# Patient Record
Sex: FEMALE | Race: WHITE | NOT HISPANIC OR LATINO | Employment: OTHER | URBAN - METROPOLITAN AREA
[De-identification: names, ages, dates, MRNs, and addresses within clinical notes are randomized per-mention and may not be internally consistent; named-entity substitution may affect disease eponyms.]

---

## 2021-03-03 DIAGNOSIS — Z23 NEED FOR VACCINATION: ICD-10-CM

## 2023-02-16 ENCOUNTER — HOSPITAL ENCOUNTER (OUTPATIENT)
Dept: LAB | Facility: MEDICAL CENTER | Age: 73
End: 2023-02-16
Attending: OTOLARYNGOLOGY
Payer: MEDICARE

## 2023-02-16 LAB
CREAT SERPL-MCNC: 0.45 MG/DL (ref 0.5–1.4)
GFR SERPLBLD CREATININE-BSD FMLA CKD-EPI: 102 ML/MIN/1.73 M 2

## 2023-02-16 PROCEDURE — 82565 ASSAY OF CREATININE: CPT

## 2023-02-16 PROCEDURE — 36415 COLL VENOUS BLD VENIPUNCTURE: CPT

## 2023-02-20 ENCOUNTER — APPOINTMENT (OUTPATIENT)
Dept: RADIOLOGY | Facility: MEDICAL CENTER | Age: 73
DRG: 606 | End: 2023-02-20
Attending: EMERGENCY MEDICINE
Payer: MEDICARE

## 2023-02-20 ENCOUNTER — APPOINTMENT (OUTPATIENT)
Dept: RADIOLOGY | Facility: MEDICAL CENTER | Age: 73
DRG: 606 | End: 2023-02-20
Attending: INTERNAL MEDICINE
Payer: MEDICARE

## 2023-02-20 ENCOUNTER — HOSPITAL ENCOUNTER (INPATIENT)
Facility: MEDICAL CENTER | Age: 73
LOS: 2 days | DRG: 606 | End: 2023-02-22
Attending: EMERGENCY MEDICINE | Admitting: INTERNAL MEDICINE
Payer: MEDICARE

## 2023-02-20 DIAGNOSIS — E83.42 HYPOMAGNESEMIA: ICD-10-CM

## 2023-02-20 DIAGNOSIS — R91.8 HILAR MASS: ICD-10-CM

## 2023-02-20 DIAGNOSIS — R13.19 ESOPHAGEAL DYSPHAGIA: ICD-10-CM

## 2023-02-20 DIAGNOSIS — Z72.0 TOBACCO ABUSE: ICD-10-CM

## 2023-02-20 DIAGNOSIS — E87.6 HYPOKALEMIA: ICD-10-CM

## 2023-02-20 DIAGNOSIS — J96.01 ACUTE HYPOXEMIC RESPIRATORY FAILURE (HCC): ICD-10-CM

## 2023-02-20 DIAGNOSIS — R91.8 LUNG MASS: ICD-10-CM

## 2023-02-20 DIAGNOSIS — J90 PLEURAL EFFUSION ON LEFT: ICD-10-CM

## 2023-02-20 DIAGNOSIS — J18.9 POSTOBSTRUCTIVE PNEUMONIA: ICD-10-CM

## 2023-02-20 PROBLEM — E86.0 DEHYDRATION: Status: ACTIVE | Noted: 2023-02-20

## 2023-02-20 PROBLEM — D72.829 LEUKOCYTOSIS: Status: ACTIVE | Noted: 2023-02-20

## 2023-02-20 LAB
ALBUMIN SERPL BCP-MCNC: 3.7 G/DL (ref 3.2–4.9)
ALBUMIN/GLOB SERPL: 1 G/DL
ALP SERPL-CCNC: 103 U/L (ref 30–99)
ALT SERPL-CCNC: 9 U/L (ref 2–50)
ANION GAP SERPL CALC-SCNC: 22 MMOL/L (ref 7–16)
AST SERPL-CCNC: 20 U/L (ref 12–45)
BASOPHILS # BLD AUTO: 0.6 % (ref 0–1.8)
BASOPHILS # BLD: 0.07 K/UL (ref 0–0.12)
BILIRUB SERPL-MCNC: 0.4 MG/DL (ref 0.1–1.5)
BUN SERPL-MCNC: 12 MG/DL (ref 8–22)
CALCIUM ALBUM COR SERPL-MCNC: 10.1 MG/DL (ref 8.5–10.5)
CALCIUM SERPL-MCNC: 9.9 MG/DL (ref 8.5–10.5)
CHLORIDE SERPL-SCNC: 97 MMOL/L (ref 96–112)
CO2 SERPL-SCNC: 21 MMOL/L (ref 20–33)
CREAT SERPL-MCNC: 0.41 MG/DL (ref 0.5–1.4)
EKG IMPRESSION: NORMAL
EKG IMPRESSION: NORMAL
EOSINOPHIL # BLD AUTO: 0.05 K/UL (ref 0–0.51)
EOSINOPHIL NFR BLD: 0.4 % (ref 0–6.9)
ERYTHROCYTE [DISTWIDTH] IN BLOOD BY AUTOMATED COUNT: 48.5 FL (ref 35.9–50)
FLUAV RNA SPEC QL NAA+PROBE: NEGATIVE
FLUBV RNA SPEC QL NAA+PROBE: NEGATIVE
GFR SERPLBLD CREATININE-BSD FMLA CKD-EPI: 105 ML/MIN/1.73 M 2
GLOBULIN SER CALC-MCNC: 3.8 G/DL (ref 1.9–3.5)
GLUCOSE SERPL-MCNC: 139 MG/DL (ref 65–99)
HCT VFR BLD AUTO: 46.6 % (ref 37–47)
HGB BLD-MCNC: 15 G/DL (ref 12–16)
IMM GRANULOCYTES # BLD AUTO: 0.06 K/UL (ref 0–0.11)
IMM GRANULOCYTES NFR BLD AUTO: 0.5 % (ref 0–0.9)
LYMPHOCYTES # BLD AUTO: 1.69 K/UL (ref 1–4.8)
LYMPHOCYTES NFR BLD: 14.9 % (ref 22–41)
MCH RBC QN AUTO: 28.2 PG (ref 27–33)
MCHC RBC AUTO-ENTMCNC: 32.2 G/DL (ref 33.6–35)
MCV RBC AUTO: 87.6 FL (ref 81.4–97.8)
MONOCYTES # BLD AUTO: 0.89 K/UL (ref 0–0.85)
MONOCYTES NFR BLD AUTO: 7.9 % (ref 0–13.4)
NEUTROPHILS # BLD AUTO: 8.55 K/UL (ref 2–7.15)
NEUTROPHILS NFR BLD: 75.7 % (ref 44–72)
NRBC # BLD AUTO: 0 K/UL
NRBC BLD-RTO: 0 /100 WBC
PLATELET # BLD AUTO: 468 K/UL (ref 164–446)
PMV BLD AUTO: 9.3 FL (ref 9–12.9)
POTASSIUM SERPL-SCNC: 3.3 MMOL/L (ref 3.6–5.5)
PROCALCITONIN SERPL-MCNC: <0.05 NG/ML
PROT SERPL-MCNC: 7.5 G/DL (ref 6–8.2)
RBC # BLD AUTO: 5.32 M/UL (ref 4.2–5.4)
RSV RNA SPEC QL NAA+PROBE: NEGATIVE
SARS-COV-2 RNA RESP QL NAA+PROBE: NOTDETECTED
SODIUM SERPL-SCNC: 140 MMOL/L (ref 135–145)
SPECIMEN SOURCE: NORMAL
WBC # BLD AUTO: 11.3 K/UL (ref 4.8–10.8)

## 2023-02-20 PROCEDURE — 71260 CT THORAX DX C+: CPT

## 2023-02-20 PROCEDURE — 700117 HCHG RX CONTRAST REV CODE 255: Performed by: EMERGENCY MEDICINE

## 2023-02-20 PROCEDURE — 99223 1ST HOSP IP/OBS HIGH 75: CPT | Mod: 25,AI | Performed by: INTERNAL MEDICINE

## 2023-02-20 PROCEDURE — 96365 THER/PROPH/DIAG IV INF INIT: CPT

## 2023-02-20 PROCEDURE — 87040 BLOOD CULTURE FOR BACTERIA: CPT

## 2023-02-20 PROCEDURE — 770004 HCHG ROOM/CARE - ONCOLOGY PRIVATE *

## 2023-02-20 PROCEDURE — 99406 BEHAV CHNG SMOKING 3-10 MIN: CPT

## 2023-02-20 PROCEDURE — 700105 HCHG RX REV CODE 258: Performed by: EMERGENCY MEDICINE

## 2023-02-20 PROCEDURE — 700105 HCHG RX REV CODE 258: Performed by: INTERNAL MEDICINE

## 2023-02-20 PROCEDURE — 93005 ELECTROCARDIOGRAM TRACING: CPT | Performed by: EMERGENCY MEDICINE

## 2023-02-20 PROCEDURE — C9803 HOPD COVID-19 SPEC COLLECT: HCPCS | Performed by: EMERGENCY MEDICINE

## 2023-02-20 PROCEDURE — 84145 PROCALCITONIN (PCT): CPT

## 2023-02-20 PROCEDURE — 700111 HCHG RX REV CODE 636 W/ 250 OVERRIDE (IP): Performed by: INTERNAL MEDICINE

## 2023-02-20 PROCEDURE — 700111 HCHG RX REV CODE 636 W/ 250 OVERRIDE (IP): Performed by: EMERGENCY MEDICINE

## 2023-02-20 PROCEDURE — 0241U HCHG SARS-COV-2 COVID-19 NFCT DS RESP RNA 4 TRGT MIC: CPT

## 2023-02-20 PROCEDURE — 70491 CT SOFT TISSUE NECK W/DYE: CPT

## 2023-02-20 PROCEDURE — 85025 COMPLETE CBC W/AUTO DIFF WBC: CPT

## 2023-02-20 PROCEDURE — 99406 BEHAV CHNG SMOKING 3-10 MIN: CPT | Performed by: INTERNAL MEDICINE

## 2023-02-20 PROCEDURE — 82150 ASSAY OF AMYLASE: CPT

## 2023-02-20 PROCEDURE — 99285 EMERGENCY DEPT VISIT HI MDM: CPT

## 2023-02-20 PROCEDURE — 36415 COLL VENOUS BLD VENIPUNCTURE: CPT

## 2023-02-20 PROCEDURE — 71045 X-RAY EXAM CHEST 1 VIEW: CPT

## 2023-02-20 PROCEDURE — 96367 TX/PROPH/DG ADDL SEQ IV INF: CPT

## 2023-02-20 PROCEDURE — 93005 ELECTROCARDIOGRAM TRACING: CPT

## 2023-02-20 PROCEDURE — 80053 COMPREHEN METABOLIC PANEL: CPT

## 2023-02-20 RX ORDER — BISACODYL 10 MG
10 SUPPOSITORY, RECTAL RECTAL
Status: DISCONTINUED | OUTPATIENT
Start: 2023-02-20 | End: 2023-02-21

## 2023-02-20 RX ORDER — POLYETHYLENE GLYCOL 3350 17 G/17G
1 POWDER, FOR SOLUTION ORAL
Status: DISCONTINUED | OUTPATIENT
Start: 2023-02-20 | End: 2023-02-21

## 2023-02-20 RX ORDER — AZITHROMYCIN 500 MG/5ML
500 INJECTION, POWDER, LYOPHILIZED, FOR SOLUTION INTRAVENOUS EVERY 24 HOURS
Status: DISCONTINUED | OUTPATIENT
Start: 2023-02-20 | End: 2023-02-20

## 2023-02-20 RX ORDER — ATORVASTATIN CALCIUM 10 MG/1
10 TABLET, FILM COATED ORAL NIGHTLY
COMMUNITY

## 2023-02-20 RX ORDER — NICOTINE 21 MG/24HR
14 PATCH, TRANSDERMAL 24 HOURS TRANSDERMAL
Status: DISCONTINUED | OUTPATIENT
Start: 2023-02-20 | End: 2023-02-22 | Stop reason: HOSPADM

## 2023-02-20 RX ORDER — AZITHROMYCIN 250 MG/1
500 TABLET, FILM COATED ORAL DAILY
Status: DISCONTINUED | OUTPATIENT
Start: 2023-02-21 | End: 2023-02-21

## 2023-02-20 RX ORDER — LISINOPRIL 5 MG/1
5 TABLET ORAL EVERY MORNING
COMMUNITY

## 2023-02-20 RX ORDER — CEFTRIAXONE 2 G/1
2000 INJECTION, POWDER, FOR SOLUTION INTRAMUSCULAR; INTRAVENOUS ONCE
Status: DISCONTINUED | OUTPATIENT
Start: 2023-02-20 | End: 2023-02-20

## 2023-02-20 RX ORDER — SODIUM CHLORIDE, SODIUM LACTATE, POTASSIUM CHLORIDE, CALCIUM CHLORIDE 600; 310; 30; 20 MG/100ML; MG/100ML; MG/100ML; MG/100ML
INJECTION, SOLUTION INTRAVENOUS CONTINUOUS
Status: DISCONTINUED | OUTPATIENT
Start: 2023-02-20 | End: 2023-02-21

## 2023-02-20 RX ORDER — AMOXICILLIN 250 MG
2 CAPSULE ORAL 2 TIMES DAILY
Status: DISCONTINUED | OUTPATIENT
Start: 2023-02-20 | End: 2023-02-21

## 2023-02-20 RX ORDER — AZITHROMYCIN 500 MG/1
500 INJECTION, POWDER, LYOPHILIZED, FOR SOLUTION INTRAVENOUS ONCE
Status: COMPLETED | OUTPATIENT
Start: 2023-02-20 | End: 2023-02-20

## 2023-02-20 RX ORDER — ONDANSETRON 2 MG/ML
4 INJECTION INTRAMUSCULAR; INTRAVENOUS EVERY 4 HOURS PRN
Status: DISCONTINUED | OUTPATIENT
Start: 2023-02-20 | End: 2023-02-22 | Stop reason: HOSPADM

## 2023-02-20 RX ADMIN — AMPICILLIN AND SULBACTAM 3 G: 1; 2 INJECTION, POWDER, FOR SOLUTION INTRAMUSCULAR; INTRAVENOUS at 22:36

## 2023-02-20 RX ADMIN — AZITHROMYCIN MONOHYDRATE 500 MG: 500 INJECTION, POWDER, LYOPHILIZED, FOR SOLUTION INTRAVENOUS at 17:45

## 2023-02-20 RX ADMIN — SODIUM CHLORIDE, POTASSIUM CHLORIDE, SODIUM LACTATE AND CALCIUM CHLORIDE: 600; 310; 30; 20 INJECTION, SOLUTION INTRAVENOUS at 19:08

## 2023-02-20 RX ADMIN — IOHEXOL 90 ML: 350 INJECTION, SOLUTION INTRAVENOUS at 15:42

## 2023-02-20 RX ADMIN — AMPICILLIN AND SULBACTAM 3 G: 1; 2 INJECTION, POWDER, FOR SOLUTION INTRAMUSCULAR; INTRAVENOUS at 16:52

## 2023-02-20 ASSESSMENT — ENCOUNTER SYMPTOMS
COUGH: 1
NAUSEA: 0
ORTHOPNEA: 1
FEVER: 0
VOMITING: 0
BLURRED VISION: 0
INSOMNIA: 0
BACK PAIN: 0
HEADACHES: 0
EYE PAIN: 0
NERVOUS/ANXIOUS: 0
WEAKNESS: 1
ABDOMINAL PAIN: 0
PALPITATIONS: 0
SHORTNESS OF BREATH: 1
CHILLS: 0
DIZZINESS: 0

## 2023-02-20 ASSESSMENT — LIFESTYLE VARIABLES: DO YOU DRINK ALCOHOL: NO

## 2023-02-20 NOTE — ED PROVIDER NOTES
ED Provider Note    CHIEF COMPLAINT  Chief Complaint   Patient presents with    Shortness of Breath     Pt reports to having a paralyzed vocal cord and is scheduled tomorrow to get a CT scan done however they are concerned that the patient is losing a great deal of weight, she feels more short of breath, pts daughter also reports that she has a cough but is unable to cough out sputum due to her condition.        EXTERNAL RECORDS REVIEWED  Reviewed outpatient clinic visits    HPI/ROS  LIMITATION TO HISTORY   None additional history provided by daughter  OUTSIDE HISTORIAN(S):  Additional history provided by daughter    Shantal Quach is a 72 y.o. female who presents for evaluation of worsening shortness of breath persistent and worsening sore throat with presumed vocal cord dysfunction.  The patient has an extensive smoking history but otherwise is relatively healthy.  Over the past 5 to 6 weeks she has had worsening sore throat and cough.  She has been referred to ENT and has recently seen Dr. Lundberg.  She is scheduled for CT scan of the neck tomorrow but symptoms worsened and presented here for evaluation.  She was noted to be tachycardic and Having slightly low oxygen levels at 91% in triage and immediately brought back.  She denies leg swelling or hemoptysis.  No known history of malignancy.    PAST MEDICAL HISTORY   Extensive smoking history    SURGICAL HISTORY   has a past surgical history that includes tonsillectomy.  None reported  FAMILY HISTORY  Family History   Problem Relation Age of Onset    Cancer Father         lung - in 50's    Cancer Maternal Aunt         colon    Heart Disease Neg Hx      History of cancer  SOCIAL HISTORY  Social History     Tobacco Use    Smoking status: Every Day     Packs/day: 2.00     Years: 35.00     Pack years: 70.00     Types: Cigarettes    Smokeless tobacco: Never   Substance and Sexual Activity    Alcohol use: Yes     Comment: rare    Drug use: Not on file    Sexual  "activity: Not on file     Comment: ; two children; wk: care giver     Extensive tobacco history  CURRENT MEDICATIONS  Home Medications       Reviewed by Gabriel Arceo R.N. (Registered Nurse) on 02/20/23 at 1313  Med List Status: Partial     Medication Last Dose Status   famciclovir (FAMVIR) 500 MG Tab  Active   hydrocodone-acetaminophen (NORCO) 5-325 MG Tab per tablet  Active                    ALLERGIES  No Known Allergies    PHYSICAL EXAM  VITAL SIGNS: /81   Pulse (!) 109   Temp 36.9 °C (98.4 °F) (Temporal)   Resp (!) 23   Ht 1.676 m (5' 6\")   Wt 57.2 kg (126 lb)   SpO2 90%   BMI 20.34 kg/m²    Pulse ox interpretation: I interpret this pulse ox as low normal   constitutional: Alert and oriented x 3, mild distress and appears chronically ill  HEENT: Atraumatic normocephalic, pupils are equal round reactive to light extraocular movements are intact. The nares is clear, external ears are normal, mouth shows moist mucous membranes normal dentition for age  Neck: Supple, no JVD no tracheal deviation  Cardiovascular: Mild tachycardia no murmur rub or gallop 2+ pulses peripherally x4  Thorax & Lungs: No respiratory distress, no wheezes rales or rhonchi, No chest tenderness.   GI: Soft nontender nondistended positive bowel sounds, no peritoneal signs  Skin: Warm dry no acute rash or lesion  Musculoskeletal: Moving all extremities with full range and 5 of 5 strength no acute  deformity  Neurologic: Cranial nerves III through XII are grossly intact no sensory deficit no cerebellar dysfunction   Psychiatric: Appropriate affect for situation at this time          DIAGNOSTIC STUDIES / PROCEDURES      LABS  Results for orders placed or performed during the hospital encounter of 02/20/23   CBC with Differential   Result Value Ref Range    WBC 11.3 (H) 4.8 - 10.8 K/uL    RBC 5.32 4.20 - 5.40 M/uL    Hemoglobin 15.0 12.0 - 16.0 g/dL    Hematocrit 46.6 37.0 - 47.0 %    MCV 87.6 81.4 - 97.8 fL    MCH 28.2 " 27.0 - 33.0 pg    MCHC 32.2 (L) 33.6 - 35.0 g/dL    RDW 48.5 35.9 - 50.0 fL    Platelet Count 468 (H) 164 - 446 K/uL    MPV 9.3 9.0 - 12.9 fL    Neutrophils-Polys 75.70 (H) 44.00 - 72.00 %    Lymphocytes 14.90 (L) 22.00 - 41.00 %    Monocytes 7.90 0.00 - 13.40 %    Eosinophils 0.40 0.00 - 6.90 %    Basophils 0.60 0.00 - 1.80 %    Immature Granulocytes 0.50 0.00 - 0.90 %    Nucleated RBC 0.00 /100 WBC    Neutrophils (Absolute) 8.55 (H) 2.00 - 7.15 K/uL    Lymphs (Absolute) 1.69 1.00 - 4.80 K/uL    Monos (Absolute) 0.89 (H) 0.00 - 0.85 K/uL    Eos (Absolute) 0.05 0.00 - 0.51 K/uL    Baso (Absolute) 0.07 0.00 - 0.12 K/uL    Immature Granulocytes (abs) 0.06 0.00 - 0.11 K/uL    NRBC (Absolute) 0.00 K/uL   Comp Metabolic Panel   Result Value Ref Range    Sodium 140 135 - 145 mmol/L    Potassium 3.3 (L) 3.6 - 5.5 mmol/L    Chloride 97 96 - 112 mmol/L    Co2 21 20 - 33 mmol/L    Anion Gap 22.0 (H) 7.0 - 16.0    Glucose 139 (H) 65 - 99 mg/dL    Bun 12 8 - 22 mg/dL    Creatinine 0.41 (L) 0.50 - 1.40 mg/dL    Calcium 9.9 8.5 - 10.5 mg/dL    AST(SGOT) 20 12 - 45 U/L    ALT(SGPT) 9 2 - 50 U/L    Alkaline Phosphatase 103 (H) 30 - 99 U/L    Total Bilirubin 0.4 0.1 - 1.5 mg/dL    Albumin 3.7 3.2 - 4.9 g/dL    Total Protein 7.5 6.0 - 8.2 g/dL    Globulin 3.8 (H) 1.9 - 3.5 g/dL    A-G Ratio 1.0 g/dL   ESTIMATED GFR   Result Value Ref Range    GFR (CKD-EPI) 105 >60 mL/min/1.73 m 2   CORRECTED CALCIUM   Result Value Ref Range    Correct Calcium 10.1 8.5 - 10.5 mg/dL   CoV-2, FLU A/B, and RSV by PCR (2-4 Hours CEPHEID) : Collect NP swab in VTM    Specimen: Nasal; Respirate   Result Value Ref Range    SARS-CoV-2 Source NP Swab    PROCALCITONIN   Result Value Ref Range    Procalcitonin <0.05 <0.25 ng/mL   EKG   Result Value Ref Range    Report       [facility] Emergency Dept.    Test Date:  2023-02-20  Pt Name:    VERONICA RAIN               Department: ER  MRN:        [id number]                      Room:  Gender:      Female                       Technician: [id number]  :        1950                   Requested By:ER TRIAGE PROTOCOL  Order #:    [id number]                    Reading MD:    Measurements  Intervals                                Axis  Rate:       116                          P:          64  FL:         172                          QRS:        -5  QRSD:       82                           T:          163  QT:         290  QTc:        403    Interpretive Statements  Sinus tachycardia  Probable left atrial enlargement  Borderline repolarization abnormality  Borderline ST elevation, anterior leads  No previous ECG available for comparison           RADIOLOGY  I have independently interpreted the diagnostic imaging associated with this visit and am waiting the final reading from the radiologist.   My preliminary interpretation is a follows: Large left-sided mediastinal mass  Radiologist interpretation:   CT-CHEST (THORAX) WITH   Final Result      1.  There is a large mediastinal mass extending into the left hilar region encasing pulmonary arteries and portions of the thoracic aorta with rightward mass effect on the trachea and narrowing of the proximal bronchi and left lower lobe and lingular    segmental bronchi. Findings are consistent with malignancy with lymphoma considered most likely etiology.   2.  There is a moderate-sized left pleural effusion.   3.  Anteromedial left upper lobe airspace process most consistent postobstructive pneumonitis.   4.  Left lower lobe and lingular opacities could represent pneumonitis or atelectasis.   5.  There is underlying emphysema.         Fleischner Society pulmonary nodule recommendations:   Not Applicable         CT-SOFT TISSUE NECK WITH   Final Result      1.  There is no cervical lymphadenopathy.   2.  Airway is patent.   3.  No evidence of acute inflammation.   4.  Upper intrathoracic findings as discussed on prior chest CT.      DX-CHEST-PORTABLE (1 VIEW)   Final Result          Elevation of the diaphragm with left basilar atelectasis. Superimposed infection is possible.      Small left pleural effusion.          COURSE & MEDICAL DECISION MAKING    ED Observation Status? No; Patient does not meet criteria for ED Observation.     INITIAL ASSESSMENT, COURSE AND PLAN  Care Narrative: This is a very pleasant 72-year-old female who comes in with several weeks of worsening hoarseness cough shortness of breath.  She has an extensive smoking history.  Patient underwent an extensive evaluation today including blood testing, viral testing chest x-ray.  Chest x-ray was grossly abnormal and subsequent CT scans of the neck and thorax were performed.  Unfortunately this CT scan is the first to diagnose a large left mediastinal mass consistent with likely malignancy.  It encompasses several of her several thoracic vital structures.  Blood cultures and viral testing is currently pending.  Consultation with infectious disease pharmacist was obtained the recommended IV Unasyn to cover for possible aspiration.  Patient will be hospitalized with the hospitalist service      ADDITIONAL PROBLEM LIST    DISPOSITION AND DISCUSSIONS  I have discussed management of the patient with the following physicians and LUIS's: Discussed with hospitalist team coordinated antibiotic choice with ER pharmacist    Discussion of management with other QHP or appropriate source(s): Discussed with ER pharmacist      FINAL DIAGNOSIS  New diagnosis of left mediastinal mass  Postobstructive pneumonia/pneumonitis       Electronically signed by: Jose Lanier M.D., 2/20/2023 2:00 PM

## 2023-02-20 NOTE — ED TRIAGE NOTES
"Shantal Quach  72 y.o.    Chief Complaint   Patient presents with    Shortness of Breath     Pt reports to having a paralyzed vocal cord and is scheduled tomorrow to get a CT scan done however they are concerned that the patient is losing a great deal of weight, she feels more short of breath, pts daughter also reports that she has a cough but is unable to cough out sputum due to her condition.        /86   Pulse (!) 119   Temp 36.9 °C (98.4 °F) (Temporal)   Resp 16   Ht 1.676 m (5' 6\")   Wt 57.2 kg (126 lb)   SpO2 91%   BMI 20.34 kg/m²     Protocol placed, pt placed in lobby and educated to alert staff with any changes or concerns.   "

## 2023-02-21 ENCOUNTER — APPOINTMENT (OUTPATIENT)
Dept: RADIOLOGY | Facility: MEDICAL CENTER | Age: 73
End: 2023-02-21
Attending: OTOLARYNGOLOGY
Payer: MEDICARE

## 2023-02-21 ENCOUNTER — APPOINTMENT (OUTPATIENT)
Dept: RADIOLOGY | Facility: MEDICAL CENTER | Age: 73
DRG: 606 | End: 2023-02-21
Attending: INTERNAL MEDICINE
Payer: MEDICARE

## 2023-02-21 DIAGNOSIS — R91.8 LUNG MASS: ICD-10-CM

## 2023-02-21 LAB
AMYLASE FLD-CCNC: 26 U/L
ANION GAP SERPL CALC-SCNC: 19 MMOL/L (ref 7–16)
APPEARANCE FLD: NORMAL
BODY FLD TYPE: NORMAL
BUN SERPL-MCNC: 9 MG/DL (ref 8–22)
CALCIUM SERPL-MCNC: 9.5 MG/DL (ref 8.5–10.5)
CHLORIDE SERPL-SCNC: 100 MMOL/L (ref 96–112)
CO2 SERPL-SCNC: 23 MMOL/L (ref 20–33)
COLOR FLD: NORMAL
CREAT SERPL-MCNC: 0.41 MG/DL (ref 0.5–1.4)
CYTOLOGY REG CYTOL: NORMAL
ERYTHROCYTE [DISTWIDTH] IN BLOOD BY AUTOMATED COUNT: 49.3 FL (ref 35.9–50)
FUNGUS SPEC FUNGUS STN: NORMAL
GFR SERPLBLD CREATININE-BSD FMLA CKD-EPI: 105 ML/MIN/1.73 M 2
GLUCOSE FLD-MCNC: 117 MG/DL
GLUCOSE SERPL-MCNC: 121 MG/DL (ref 65–99)
GRAM STN SPEC: NORMAL
HCT VFR BLD AUTO: 43 % (ref 37–47)
HGB BLD-MCNC: 14.1 G/DL (ref 12–16)
INR PPP: 1.09 (ref 0.87–1.13)
LDH FLD L TO P-CCNC: 831 U/L
LDH SERPL L TO P-CCNC: 480 U/L (ref 107–266)
LYMPHOCYTES NFR FLD: 91 %
MAGNESIUM SERPL-MCNC: 1.4 MG/DL (ref 1.5–2.5)
MCH RBC QN AUTO: 28.7 PG (ref 27–33)
MCHC RBC AUTO-ENTMCNC: 32.8 G/DL (ref 33.6–35)
MCV RBC AUTO: 87.4 FL (ref 81.4–97.8)
MESOTHL CELL NFR FLD: 1 %
MONONUC CELLS NFR FLD: 4 %
NEUTROPHILS NFR FLD: 4 %
PH FLD: 8 [PH]
PLATELET # BLD AUTO: 416 K/UL (ref 164–446)
PMV BLD AUTO: 9.6 FL (ref 9–12.9)
POTASSIUM SERPL-SCNC: 3 MMOL/L (ref 3.6–5.5)
PROT FLD-MCNC: 4.9 G/DL
PROT SERPL-MCNC: 7.2 G/DL (ref 6–8.2)
PROTHROMBIN TIME: 14 SEC (ref 12–14.6)
RBC # BLD AUTO: 4.92 M/UL (ref 4.2–5.4)
RBC # FLD: NORMAL CELLS/UL
SIGNIFICANT IND 70042: NORMAL
SIGNIFICANT IND 70042: NORMAL
SITE SITE: NORMAL
SITE SITE: NORMAL
SODIUM SERPL-SCNC: 142 MMOL/L (ref 135–145)
SOURCE SOURCE: NORMAL
SOURCE SOURCE: NORMAL
WBC # BLD AUTO: 8.9 K/UL (ref 4.8–10.8)
WBC # FLD: 5926 CELLS/UL

## 2023-02-21 PROCEDURE — 0W9B3ZZ DRAINAGE OF LEFT PLEURAL CAVITY, PERCUTANEOUS APPROACH: ICD-10-PCS | Performed by: RADIOLOGY

## 2023-02-21 PROCEDURE — 700117 HCHG RX CONTRAST REV CODE 255: Performed by: INTERNAL MEDICINE

## 2023-02-21 PROCEDURE — 92612 ENDOSCOPY SWALLOW (FEES) VID: CPT

## 2023-02-21 PROCEDURE — A9270 NON-COVERED ITEM OR SERVICE: HCPCS | Performed by: INTERNAL MEDICINE

## 2023-02-21 PROCEDURE — 80048 BASIC METABOLIC PNL TOTAL CA: CPT

## 2023-02-21 PROCEDURE — 84155 ASSAY OF PROTEIN SERUM: CPT

## 2023-02-21 PROCEDURE — 700102 HCHG RX REV CODE 250 W/ 637 OVERRIDE(OP): Performed by: INTERNAL MEDICINE

## 2023-02-21 PROCEDURE — 87015 SPECIMEN INFECT AGNT CONCNTJ: CPT

## 2023-02-21 PROCEDURE — 700111 HCHG RX REV CODE 636 W/ 250 OVERRIDE (IP): Performed by: STUDENT IN AN ORGANIZED HEALTH CARE EDUCATION/TRAINING PROGRAM

## 2023-02-21 PROCEDURE — 88112 CYTOPATH CELL ENHANCE TECH: CPT

## 2023-02-21 PROCEDURE — 70470 CT HEAD/BRAIN W/O & W/DYE: CPT

## 2023-02-21 PROCEDURE — 87070 CULTURE OTHR SPECIMN AEROBIC: CPT

## 2023-02-21 PROCEDURE — 82945 GLUCOSE OTHER FLUID: CPT

## 2023-02-21 PROCEDURE — 36415 COLL VENOUS BLD VENIPUNCTURE: CPT

## 2023-02-21 PROCEDURE — 85027 COMPLETE CBC AUTOMATED: CPT

## 2023-02-21 PROCEDURE — 71045 X-RAY EXAM CHEST 1 VIEW: CPT

## 2023-02-21 PROCEDURE — 700102 HCHG RX REV CODE 250 W/ 637 OVERRIDE(OP): Performed by: STUDENT IN AN ORGANIZED HEALTH CARE EDUCATION/TRAINING PROGRAM

## 2023-02-21 PROCEDURE — 84157 ASSAY OF PROTEIN OTHER: CPT

## 2023-02-21 PROCEDURE — 87206 SMEAR FLUORESCENT/ACID STAI: CPT

## 2023-02-21 PROCEDURE — 99232 SBSQ HOSP IP/OBS MODERATE 35: CPT | Performed by: STUDENT IN AN ORGANIZED HEALTH CARE EDUCATION/TRAINING PROGRAM

## 2023-02-21 PROCEDURE — 85610 PROTHROMBIN TIME: CPT

## 2023-02-21 PROCEDURE — A9270 NON-COVERED ITEM OR SERVICE: HCPCS | Performed by: STUDENT IN AN ORGANIZED HEALTH CARE EDUCATION/TRAINING PROGRAM

## 2023-02-21 PROCEDURE — 88341 IMHCHEM/IMCYTCHM EA ADD ANTB: CPT

## 2023-02-21 PROCEDURE — 88305 TISSUE EXAM BY PATHOLOGIST: CPT

## 2023-02-21 PROCEDURE — 83615 LACTATE (LD) (LDH) ENZYME: CPT | Mod: 91

## 2023-02-21 PROCEDURE — 88342 IMHCHEM/IMCYTCHM 1ST ANTB: CPT

## 2023-02-21 PROCEDURE — 87205 SMEAR GRAM STAIN: CPT | Mod: 91

## 2023-02-21 PROCEDURE — 92610 EVALUATE SWALLOWING FUNCTION: CPT

## 2023-02-21 PROCEDURE — 87102 FUNGUS ISOLATION CULTURE: CPT

## 2023-02-21 PROCEDURE — 700101 HCHG RX REV CODE 250: Performed by: STUDENT IN AN ORGANIZED HEALTH CARE EDUCATION/TRAINING PROGRAM

## 2023-02-21 PROCEDURE — 700111 HCHG RX REV CODE 636 W/ 250 OVERRIDE (IP): Performed by: INTERNAL MEDICINE

## 2023-02-21 PROCEDURE — 70460 CT HEAD/BRAIN W/DYE: CPT

## 2023-02-21 PROCEDURE — 83735 ASSAY OF MAGNESIUM: CPT

## 2023-02-21 PROCEDURE — 89051 BODY FLUID CELL COUNT: CPT

## 2023-02-21 PROCEDURE — 99222 1ST HOSP IP/OBS MODERATE 55: CPT | Mod: GC | Performed by: INTERNAL MEDICINE

## 2023-02-21 PROCEDURE — 74177 CT ABD & PELVIS W/CONTRAST: CPT

## 2023-02-21 PROCEDURE — 700105 HCHG RX REV CODE 258: Performed by: INTERNAL MEDICINE

## 2023-02-21 PROCEDURE — 4410569 US-THORACENTESIS PUNCTURE

## 2023-02-21 PROCEDURE — 87116 MYCOBACTERIA CULTURE: CPT

## 2023-02-21 PROCEDURE — 83986 ASSAY PH BODY FLUID NOS: CPT

## 2023-02-21 PROCEDURE — 770004 HCHG ROOM/CARE - ONCOLOGY PRIVATE *

## 2023-02-21 RX ORDER — SODIUM CHLORIDE AND POTASSIUM CHLORIDE 300; 900 MG/100ML; MG/100ML
1000 INJECTION, SOLUTION INTRAVENOUS CONTINUOUS
Status: DISCONTINUED | OUTPATIENT
Start: 2023-02-21 | End: 2023-02-22

## 2023-02-21 RX ORDER — MAGNESIUM SULFATE HEPTAHYDRATE 40 MG/ML
2 INJECTION, SOLUTION INTRAVENOUS ONCE
Status: COMPLETED | OUTPATIENT
Start: 2023-02-21 | End: 2023-02-21

## 2023-02-21 RX ORDER — LISINOPRIL 2.5 MG/1
5 TABLET ORAL EVERY MORNING
Status: DISCONTINUED | OUTPATIENT
Start: 2023-02-22 | End: 2023-02-22 | Stop reason: HOSPADM

## 2023-02-21 RX ORDER — POTASSIUM CHLORIDE 20 MEQ/1
40 TABLET, EXTENDED RELEASE ORAL EVERY 4 HOURS
Status: ACTIVE | OUTPATIENT
Start: 2023-02-21 | End: 2023-02-21

## 2023-02-21 RX ORDER — BISACODYL 10 MG
10 SUPPOSITORY, RECTAL RECTAL
Status: DISCONTINUED | OUTPATIENT
Start: 2023-02-21 | End: 2023-02-22 | Stop reason: HOSPADM

## 2023-02-21 RX ORDER — IPRATROPIUM BROMIDE AND ALBUTEROL SULFATE 2.5; .5 MG/3ML; MG/3ML
3 SOLUTION RESPIRATORY (INHALATION)
Status: DISCONTINUED | OUTPATIENT
Start: 2023-02-21 | End: 2023-02-22 | Stop reason: HOSPADM

## 2023-02-21 RX ORDER — OMEPRAZOLE 20 MG/1
20 CAPSULE, DELAYED RELEASE ORAL 2 TIMES DAILY
Status: DISCONTINUED | OUTPATIENT
Start: 2023-02-21 | End: 2023-02-22 | Stop reason: HOSPADM

## 2023-02-21 RX ORDER — POTASSIUM CHLORIDE 20 MEQ/1
40 TABLET, EXTENDED RELEASE ORAL ONCE
Status: COMPLETED | OUTPATIENT
Start: 2023-02-21 | End: 2023-02-21

## 2023-02-21 RX ORDER — POLYETHYLENE GLYCOL 3350 17 G/17G
1 POWDER, FOR SOLUTION ORAL
Status: DISCONTINUED | OUTPATIENT
Start: 2023-02-21 | End: 2023-02-22 | Stop reason: HOSPADM

## 2023-02-21 RX ORDER — ATORVASTATIN CALCIUM 10 MG/1
10 TABLET, FILM COATED ORAL NIGHTLY
Status: DISCONTINUED | OUTPATIENT
Start: 2023-02-21 | End: 2023-02-22 | Stop reason: HOSPADM

## 2023-02-21 RX ORDER — AMOXICILLIN 250 MG
2 CAPSULE ORAL 2 TIMES DAILY PRN
Status: DISCONTINUED | OUTPATIENT
Start: 2023-02-21 | End: 2023-02-22 | Stop reason: HOSPADM

## 2023-02-21 RX ADMIN — SENNOSIDES AND DOCUSATE SODIUM 2 TABLET: 50; 8.6 TABLET ORAL at 17:23

## 2023-02-21 RX ADMIN — MAGNESIUM SULFATE HEPTAHYDRATE 2 G: 40 INJECTION, SOLUTION INTRAVENOUS at 10:34

## 2023-02-21 RX ADMIN — ATORVASTATIN CALCIUM 10 MG: 10 TABLET, FILM COATED ORAL at 21:32

## 2023-02-21 RX ADMIN — POTASSIUM CHLORIDE 40 MEQ: 1500 TABLET, EXTENDED RELEASE ORAL at 19:30

## 2023-02-21 RX ADMIN — SODIUM CHLORIDE, POTASSIUM CHLORIDE, SODIUM LACTATE AND CALCIUM CHLORIDE: 600; 310; 30; 20 INJECTION, SOLUTION INTRAVENOUS at 05:39

## 2023-02-21 RX ADMIN — OMEPRAZOLE 20 MG: 20 CAPSULE, DELAYED RELEASE ORAL at 17:23

## 2023-02-21 RX ADMIN — POTASSIUM CHLORIDE AND SODIUM CHLORIDE 1000 ML: 900; 300 INJECTION, SOLUTION INTRAVENOUS at 19:28

## 2023-02-21 RX ADMIN — AMPICILLIN AND SULBACTAM 3 G: 1; 2 INJECTION, POWDER, FOR SOLUTION INTRAMUSCULAR; INTRAVENOUS at 05:13

## 2023-02-21 RX ADMIN — IOHEXOL 80 ML: 350 INJECTION, SOLUTION INTRAVENOUS at 15:50

## 2023-02-21 RX ADMIN — NICOTINE 14 MG: 14 PATCH TRANSDERMAL at 05:12

## 2023-02-21 ASSESSMENT — COGNITIVE AND FUNCTIONAL STATUS - GENERAL
SUGGESTED CMS G CODE MODIFIER MOBILITY: CH
DAILY ACTIVITIY SCORE: 24
MOBILITY SCORE: 24
SUGGESTED CMS G CODE MODIFIER DAILY ACTIVITY: CH

## 2023-02-21 ASSESSMENT — ENCOUNTER SYMPTOMS
STRIDOR: 0
PALPITATIONS: 0
CHILLS: 0
BACK PAIN: 0
DIAPHORESIS: 0
NAUSEA: 0
HEADACHES: 0
BLURRED VISION: 0
BRUISES/BLEEDS EASILY: 0
DOUBLE VISION: 0
INSOMNIA: 0
POLYDIPSIA: 0
WHEEZING: 0
COUGH: 0
WEIGHT LOSS: 1
VOMITING: 0
SPUTUM PRODUCTION: 0
DIZZINESS: 0
SHORTNESS OF BREATH: 1
ABDOMINAL PAIN: 0
DIARRHEA: 0
CONSTIPATION: 0
FEVER: 0
MYALGIAS: 0

## 2023-02-21 ASSESSMENT — COPD QUESTIONNAIRES
DURING THE PAST 4 WEEKS HOW MUCH DID YOU FEEL SHORT OF BREATH: NONE/LITTLE OF THE TIME
HAVE YOU SMOKED AT LEAST 100 CIGARETTES IN YOUR ENTIRE LIFE: YES
COPD SCREENING SCORE: 4
DO YOU EVER COUGH UP ANY MUCUS OR PHLEGM?: NO/ONLY WITH OCCASIONAL COLDS OR INFECTIONS

## 2023-02-21 ASSESSMENT — PATIENT HEALTH QUESTIONNAIRE - PHQ9
1. LITTLE INTEREST OR PLEASURE IN DOING THINGS: NOT AT ALL
SUM OF ALL RESPONSES TO PHQ9 QUESTIONS 1 AND 2: 0
2. FEELING DOWN, DEPRESSED, IRRITABLE, OR HOPELESS: NOT AT ALL

## 2023-02-21 ASSESSMENT — LIFESTYLE VARIABLES: SUBSTANCE_ABUSE: 0

## 2023-02-21 NOTE — ASSESSMENT & PLAN NOTE
SLP consulted, underwent FEES with visualized LES reflux likely due to distort ation by mediastinal mass  She has opted against PEG placement and to eat despite risk

## 2023-02-21 NOTE — THERAPY
"Speech Language Pathology   Fiberoptic Endoscopic Evaluation of Swallow     Patient Name: Shantal Quach  AGE:  72 y.o., SEX:  female  Medical Record #: [id number]  Today's Date: 2/21/2023     Precautions  Precautions: Swallow Precautions ( See Comments)    HPI: 71 YO female admitted 2/20 2/2 SOB and hoarseness.     PMHx: tobacco use.     CMHx: hilar mass, acute hypoxemic respiratory failure, pleural effusion on L, tobacco abuse, dehydration, leukocytoisis, dysphagia, recent dx of paralyzed vocal fold.     CXR 2/21 \"   1.  Small left pleural effusion with elevation of the left hemidiaphragm. No evidence of left-sided pneumothorax status post thoracentesis.2.  Ectasia of the thoracic aorta.3.  Prominence of the left pulmonary hilum which may be secondary to adenopathy/mass.\"    CT soft tissue of neck 2/20 \"1.  There is no cervical lymphadenopathy.  2.  Airway is patent.  3.  No evidence of acute inflammation.  4.  Upper intrathoracic findings as discussed on prior chest CT.\"    CT Chest 2/20 \"1.  There is a large mediastinal mass extending into the left hilar region encasing pulmonary arteries and portions of the thoracic aorta with rightward mass effect on the trachea and narrowing of the proximal bronchi and left lower lobe and lingular   segmental bronchi. Findings are consistent with malignancy with lymphoma considered most likely etiology.  2.  There is a moderate-sized left pleural effusion.  3.  Anteromedial left upper lobe airspace process most consistent postobstructive pneumonitis.  4.  Left lower lobe and lingular opacities could represent pneumonitis or atelectasis.  5.  There is underlying emphysema.\"      Current Method of Nutrition   NPO until cleared by speech pathology      Pertinent Information:  Affect/Behavior: Calm, Cooperative  Oxygen Requirements:  2 L Nasal Cannula  Feeding Tube: None  Dentition: Fair  Factor(s) Affecting Performance: None    Discussed the risks, benefits, and alternatives " of the FEES procedure. Patient/family acknowledged and agreed to proceed.     Assessment  Flexible Endoscopic Evaluation of Swallowing (FEES) completed at bedside today. The endoscope was passed transnasally via right nare to evaluate the anatomy and physiology of swallowing. Pt tolerated the procedure with no apparent distress.    Anatomic Findings:  Protrusion from posterior pharyngeal wall at the level of the epiglottis, can be consistent with osteophyte  Vocal Fold Motion:  Limited mobility of left arytenoid, hyperfunction of right arytenoid, pt did not achieve full vocal fold closure despite hyperfunction of right arytenoid.  Secretion Management: WNL  PO trials: ice chips, thin liquids, mildly thick liquids, liquidized      Consistency PAS Score Timing Comments   Ice Chips 1 N/A    Thin Liquid 7 Post swallow PAS 3 after tsp  PAS 5 after tsp   PAS 7 after cup       Trace vallecular and lateral channel residue    Mildly Thick Liquid 5 Post swallow PAS 3 after tsp   PAS  5 after cup    Mild vallecular residue    Liquidized 7 Post swallow PAS 7 after    Mild vallecular residue      Penetration-Aspiration Scale (PAS)  1     No contrast enters airway  2     Contrast enters the airway, remains above the vocal folds, and is ejected from the airway (not seen in the airway at the end of the swallow).  3     Contrast enters the airway, remains above the vocal folds, and is not ejected from the airway (is seen in the airway after the swallow).  4     Contrast enters the airway, contacts the vocal folds, and is ejected from the airway.  5     Contrast enters the airway, contacts the vocal folds, and is not ejected from the airway  6     Contrast enters the airway, crosses the plane of the vocal folds, and is ejected from the airway.  7     Contrast enters the airway, crosses the plane of the vocal folds, and is not ejected from the airway despite effort.  8     Contrast enters the airway, crosses the plane of the vocal  folds, is not ejected from the airway and there is no response to aspiration.    Oral phase:  - Adequate bolus acceptance, containment and transit.   - Pt demonstrated functional mastication of ice chips   - Swallow initiation at the level of the vallecula     Pharyngeal phase:  - No significant pharyngeal residue  - No penetration or aspiration before or during the swallow  - Pt is not as risk for descending aspiration    Esophageal Phase  - Retrograde flow above the level of the UES across trials  - Even after re-swallowing retrograde flow material, bolus was regurgitated again, and with cues pt eventually worked bolus back into oral cavity to be removed via oral suctioning.  - Penetration to the level of the vocal folds after the swallow on retrograde flow material with thins by tsp and MTL by cup  - Aspiration of retrograde flow material occurred with thins by cup and liquidized   - Of note, pt denies hx of reflux/GERD  - Pt is at high risk for ascending aspiration  - Pt was sensate to episodes of penetration and aspiration, but a spontaneous cleansing swallow was not effective in clearing penetrated material, nor was a volitional cough effective in clearing aspirated material     Clinical Impressions  The pt presents with a severe pharyngeal-esophageal dysphagia, likely acute related to large mediastinal mass. Pt presenting with function oropharyngeal swallow and is not at risk for descending aspiration. However, pt is AT HIGH RISK FOR ASCENDING ASPIRATION due to severe esophageal dysphagia characterized by immediate retrograde flow above the level of the UES with penetration and aspiration of retrograde flow material.      MD to discuss results and nutrition options further with patient. A PO diet does not appear functional at this time as pt immediately has retrograde flow of all bolus types which she is able to work into oral cavity and remove via [place]. A PO diet would also increase the risk of  developing aspiration pneumonia from ascending aspiration of retrograde flow material.     Recommendations  NPO with ice chips to reduce xerostomia and maintain the integrity of the swallow musculature.  Meds non-orally  Esophagram for further assessment of esophageal function  Consider GI consult   Consideration of medical intervention for suspected GERD  Diligent oral care       ASPIRATION PNEUMONIA  Aspiration pneumonia is the most common cause of death after PEG placement (30). Data consistently show that feeding tubes (both NG and PEG) actually increase the risk of aspiration pneumonia, perhaps by increasing gastroesophageal reflux or oropharyngeal colonization (31,32). Neurogenic dysphagia patients fed with NG, PEG, jejunostomy, or post-pyloric tubes all have similar rates of aspiration pneumonia (33,34).    [email or web address]    Plan    Recommend Speech Therapy 3 times per week until therapy goals are met for the following treatments:  Dysphagia Training and Patient / Family / Caregiver Education.    Discharge Recommendations: Other (TBD pending GOC discussion)    Objective     02/21/23 1345   Charge Group   SLP Video Swallow / FEES Video Flexible Fiberoptic Endoscopy Evaluation   Initial Contact Note    Initial Contact Note  Order Received and Verified, Speech Therapy Evaluation in Progress with Full Report to Follow.   Precautions   Precautions Swallow Precautions ( See Comments)   Vitals   O2 (LPM) 2   O2 Delivery Device Silicone Nasal Cannula   Pain 0 - 10 Group   Therapist Pain Assessment Post Activity Pain Same as Prior to Activity;0;Nurse Notified   Prior Living Situation   Prior Services None   Housing / Facility 1 Story House   Lives with - Patient's Self Care Capacity Sibling   Prior Level Of Function   Communication Within Functional Limits   Swallow Impaired   Dentition Intact   Dentures None   Hearing Within  "Functional Limits for Evaluation   Vision Within Functional Limits for Evaluation   Patient's Primary Language English   Dysphagia Rating   Nutritional Liquid Intake Rating Scale Nothing by mouth  (ice chips okay)   Nutritional Food Intake Rating Scale Nothing by mouth   Problem List   Problem List Dysphagia   Patient / Family Goals   Patient / Family Goal #1 \"I like the ice.\"   Goal #1 Outcome Progressing as expected   Short Term Goals   Short Term Goal # 1 pt will complete FEES.   Goal Outcome # 1 Goal met   Short Term Goal # 2 Pt will consume single ice chips as tolerated and with frequent oral care when sitting at 90 degrees.   Goal Outcome # 2  Progressing as expected   Education Group   Education Provided Dysphagia   Dysphagia Patient Response Patient;Acceptance;Explanation;Demonstration;Verbal Demonstration;Action Demonstration  (reviewed FEES images)   Anticipated Discharge Needs   Discharge Recommendations Other  (TBD pending GOC discussion)   Therapy Recommendations Upon DC Dysphagia Training;Community Re-Integration;Patient / Family / Caregiver Education   Interdisciplinary Plan of Care Collaboration   IDT Collaboration with  Nursing;Physician   Patient Position at End of Therapy Seated;In Bed;Bed Alarm On;Call Light within Reach;Tray Table within Reach;Phone within Reach   Collaboration Comments RN and MD aware of results/recs   Total Treatment Time   SLP Time Spent Yes   SLP Video Swallow / FEES (Mins) 45       "

## 2023-02-21 NOTE — ASSESSMENT & PLAN NOTE
Patient risk factor includes 50 years tobacco smoking  CT confirming large left hilar mass with mass effect to trachea, aorta and bronchi  Pulmonology consulted, coordinating outpatient EBUS for Thursday 2/23  Oncology consulted, completing stating CTs and coordinating outpatient biopsy prior to follow up in [facility] clinic  RadOnc consultation deferred pending tissue diagnosis, no emergent need for XRT

## 2023-02-21 NOTE — CARE PLAN
The patient is Watcher - Medium risk of patient condition declining or worsening    Shift Goals  Clinical Goals: pt will remain NPO through the night and get CT of head, as well as Abd and pelvis  Patient Goals: Pt will rest comfortably through the night  Family Goals: not present at this time    Progress made toward(s) clinical / shift goals: pt has remained NPO throughout the night; pt has been resting comfortable    Patient is not progressing towards the following goals: Pt did not get the CT of her head; nor did she receive a CT of her abd and pelvis    Problem: Respiratory  Goal: Patient will achieve/maintain optimum respiratory ventilation and gas exchange  Outcome: Progressing     Problem: Self Care  Goal: Patient will have the ability to perform ADLs independently or with assistance (bathe, groom, dress, toilet and feed)  Outcome: Progressing     Problem: Knowledge Deficit - Standard  Goal: Patient and family/care givers will demonstrate understanding of plan of care, disease process/condition, diagnostic tests and medications  Outcome: Progressing  Note: Pt is alert and oriented x 4; is aware and understands plan of care; all questions have been answered at this time.

## 2023-02-21 NOTE — ASSESSMENT & PLAN NOTE
50 pack year history  Patient is willing to quit at this time, agrees to nicotine patch while hospitalized

## 2023-02-21 NOTE — THERAPY
"Speech Language Pathology   Clinical Swallow Evaluation     Patient Name: Shantal Quach  AGE:  72 y.o., SEX:  female  Medical Record #: [id number]  Today's Date: 2/21/2023     Precautions  Precautions: Swallow Precautions ( See Comments)    Assessment   Per EMR-HISTORY OF PRESENT ILLNESS:  The patient is a very pleasant 72-year-old lady   who has been complaining of weight loss of about 40 pounds over the past 3-4   months along with difficulty breathing.  She was seen in the ER and a CT scan   showed a large subhilar mass.  The CT scan also revealed that the hilar mass   was encasing the pulmonary arteries and portions of the thoracic aorta with   the rightward mass effect on the trachea and narrowing of the proximal bronchi   and left lower lobe lingular segmental bronchi as well.      Imaging-  CT neck results include \"airway is patent.\"  CT chest xray results include-large mediastinal mass. Rightward mass effect on the trachea.     Per notes-recent eval by ENT and paralyzed vocal fold dx.     PMHx:  Significant smoking history. No prior SLP services at [facility].     Level of Consciousness: Alert  Affect/Behavior: Appropriate  Follows Directives: Yes  Orientation: Situation  Hearing: Functional hearing    Prior Living Situation & Level of Function:  Lives indep locally.       Oral Mechanism Evaluation  Facial Symmetry: Equal  Labial Observations: WFL  Lingual Observations: Midline  Dentition: Full dentures  Comments:      Voice  Quality:  breathy,  low intensity, diplophonic voice.   Intensity: Soft  Cough: Diplophonic sounding cued cough.   Comments:  Per notes, and pt report, pt seen by ENT and dx with paralyzed vocal fold. Pt stating paralyzed fold is on the left.     Current Method of Nutrition   Pt answering she has not been able to eat or drink for approx 5 days.       Subjective  \"I am starving.\"       Assessment  Positioning: Olivera's (60-90 degrees)  Bolus Administration: SLP and pt  Oxygen " "Requirements:    L Nasal Cannula  Factor(s) Affecting Performance: None    Swallowing Trials  Ice: Impaired  Thin Liquid (TN0): Impaired    Comments:  When asked, pt stating throat pain at \"2\" with \"10\" designated painful. Pt with 3-5 swallows per single ice chip and with single sips of thin water from the spoon or cup. Belching and possible mild exhalation stridor with cervical ausculation. No s/s of respiratory difficulty. When asked, pt stating she has been belching and dgtr commenting that she has been sneezing during sips of water. Pt's dgtr also reporting \"she has been putting her head down when she swallows.\" Pt stating chin down makes it easier to swallow.        Clinical Impressions  Pt with diplophonia, newly diagnosed mediastinal mass and paralyzed vocal fold, and swallow difficulty with no intake x5 days. S/s of dysphagia with 3-5 repeat swallows during intake of ice and TNO water. SLP with concerns for possible laryngeal nerve, recurrent or superior, or obstruction concerns, pharyngeal or esophageal, r/t pt's current diagnosis of mediastinal mass. FEES is recommended. Educ provided regarding FEES and possible NG. Pt in agreement for FEES. SLP collaborated with RN and FEES order obtained. Pt recommended for ice as tolerated. Swallow strategies posted for ice. Oral suction set up and pt educ regarding it's use.        Recommendations  1.  FEES, ice as tolerated. NPO for meds and nutrition.   2.  Instrumentation: FEES  3.  Swallowing Instructions & Precautions:   Supervision: Independent  Positioning: Fully upright and midline during oral intake  Medication: Non Oral   Strategies: multiple swallows during intake of ice.   Oral Care: Q4h  See treatment flow sheet for complete notes.   "

## 2023-02-21 NOTE — ASSESSMENT & PLAN NOTE
Likely malignant  Unclear how much was removed; no procedure note available  Exudate 3/3 Light's, 91% Lymphs, cultures ngtd  Follow up cultures/cytology

## 2023-02-21 NOTE — CONSULTS
Pulmonary Consult Note    Date of consult: 2/21/2023  Resident: Roberto Muhammad M.D.  Attending:  Dr. Lott    Referring Physician  Neo Song M.D.    Reason for Consultation  Hilar mass    History of Present Illness  Shantal Quach is a 72 y.o. female current 2 ppd smoker of 70 pack years with a PMHx significant for ?vocal cord dysfunction, DM2 not on insulin, DLD, and HTN who presented on 2/20/2023 with worsening dyspnea the past 5-6 weeks.  CT revealed a left mediastinal mass with left pleural effusion and postobstructive pneumonitis.  Procal <0.05.  Flu/RSV/COVID swab negative.  She underwent left thoracentesis earlier today.  40 lb unintentional weight loss over the past 4 months.  She feels due to appetite loss as well as difficulty swallowing.  Patient states she was diagnosed with vocal cord dysfunction recently by ENT.  Family history of lung cancer in her father.      Code Status  DNAR/DNI    Past Medical History   has no past medical history on file.    Past Surgical History   has a past surgical history that includes tonsillectomy.    Medications  Home Medications       Reviewed by Sarah Lou (Pharmacy Tech) on 02/20/23 at 1635  Med List Status: Complete     Medication Last Dose Status   atorvastatin (LIPITOR) 10 MG Tab 2/19/2023 Active   lisinopril (PRINIVIL) 5 MG Tab 2/20/2023 Active   metFORMIN (GLUCOPHAGE) 500 MG Tab 2/20/2023 Active                    Allergies  No Known Allergies    Social History   reports that she has been smoking cigarettes. She has a 70.00 pack-year smoking history. She has never used smokeless tobacco. She reports current alcohol use.     Family History  family history includes Cancer in her father and maternal aunt.    Review of Systems  Review of Systems   Constitutional:  Positive for malaise/fatigue and weight loss. Negative for chills, diaphoresis and fever.   HENT:  Negative for congestion and hearing loss.         +Hoarse voice   Eyes:  Negative for  blurred vision and double vision.   Respiratory:  Positive for shortness of breath. Negative for cough, sputum production, wheezing and stridor.    Cardiovascular:  Negative for chest pain and palpitations.   Gastrointestinal:  Negative for abdominal pain, constipation, diarrhea, nausea and vomiting.   Genitourinary:  Negative for dysuria and urgency.   Musculoskeletal:  Negative for back pain and myalgias.   Skin:  Negative for itching and rash.   Neurological:  Negative for dizziness and headaches.   Endo/Heme/Allergies:  Negative for polydipsia. Does not bruise/bleed easily.   Psychiatric/Behavioral:  Negative for substance abuse. The patient does not have insomnia.      Vital Signs last 24 hours  Temp:  [35.8 °C (96.5 °F)-36.9 °C (98.4 °F)] 36.6 °C (97.8 °F)  Pulse:  [100-120] 107  Resp:  [16-48] 16  BP: (111-149)/(68-96) 125/68  SpO2:  [90 %-100 %] 100 %  O2 therapy: Pulse Oximetry: 100 %, O2 (LPM): 2, O2 Delivery Device: Silicone Nasal Cannula          Fluids  Intake/Output       None             Physical Exam  Physical Exam  Constitutional:       General: She is not in acute distress.     Comments: Frail, elderly female  +hoarse voice   HENT:      Head: Normocephalic and atraumatic.      Right Ear: External ear normal.      Left Ear: External ear normal.      Mouth/Throat:      Mouth: Mucous membranes are moist.   Eyes:      General: No scleral icterus.     Conjunctiva/sclera: Conjunctivae normal.   Cardiovascular:      Rate and Rhythm: Regular rhythm. Tachycardia present.   Pulmonary:      Effort: Pulmonary effort is normal. No respiratory distress.      Breath sounds: Stridor present. No wheezing or rales.   Abdominal:      Palpations: Abdomen is soft.      Tenderness: There is no abdominal tenderness.   Musculoskeletal:      Right lower leg: No edema.      Left lower leg: No edema.   Skin:     General: Skin is warm and dry.   Neurological:      Mental Status: She is alert and oriented to person, place, and  time.      Coordination: Coordination normal.   Psychiatric:         Behavior: Behavior normal.         Thought Content: Thought content normal.       Laboratory  Recent Labs     02/20/23  1321 02/21/23  0043   WBC 11.3* 8.9   NEUTSPOLYS 75.70*  --    LYMPHOCYTES 14.90*  --    MONOCYTES 7.90  --    EOSINOPHILS 0.40  --    BASOPHILS 0.60  --    ASTSGOT 20  --    ALTSGPT 9  --    ALKPHOSPHAT 103*  --    TBILIRUBIN 0.4  --      Recent Labs     02/20/23  1321 02/21/23  0043   SODIUM 140 142   POTASSIUM 3.3* 3.0*   CHLORIDE 97 100   CO2 21 23   BUN 12 9   CREATININE 0.41* 0.41*   MAGNESIUM  --  1.4*   CALCIUM 9.9 9.5     Recent Labs     02/20/23  1321 02/21/23  0043   ALTSGPT 9  --    ASTSGOT 20  --    ALKPHOSPHAT 103*  --    TBILIRUBIN 0.4  --    GLUCOSE 139* 121*           Imaging  DX-CHEST-PORTABLE (1 VIEW)   Final Result      1.  Small left pleural effusion with elevation of the left hemidiaphragm. No evidence of left-sided pneumothorax status post thoracentesis.      2.  Ectasia of the thoracic aorta.      3.  Prominence of the left pulmonary hilum which may be secondary to adenopathy/mass.      CT-CHEST (THORAX) WITH   Final Result      1.  There is a large mediastinal mass extending into the left hilar region encasing pulmonary arteries and portions of the thoracic aorta with rightward mass effect on the trachea and narrowing of the proximal bronchi and left lower lobe and lingular    segmental bronchi. Findings are consistent with malignancy with lymphoma considered most likely etiology.   2.  There is a moderate-sized left pleural effusion.   3.  Anteromedial left upper lobe airspace process most consistent postobstructive pneumonitis.   4.  Left lower lobe and lingular opacities could represent pneumonitis or atelectasis.   5.  There is underlying emphysema.         Fleischner Society pulmonary nodule recommendations:   Not Applicable         CT-SOFT TISSUE NECK WITH   Final Result      1.  There is no cervical  lymphadenopathy.   2.  Airway is patent.   3.  No evidence of acute inflammation.   4.  Upper intrathoracic findings as discussed on prior chest CT.      DX-CHEST-PORTABLE (1 VIEW)   Final Result         Elevation of the diaphragm with left basilar atelectasis. Superimposed infection is possible.      Small left pleural effusion.      CT-ABDOMEN-PELVIS WITH    (Results Pending)   CT-HEAD WITH    (Results Pending)   US-THORACENTESIS PUNCTURE LEFT    (Results Pending)       Problem List  Principal Problem:    Hilar mass POA: Yes  Active Problems:    Other dysphagia POA: Yes    Leukocytosis POA: Yes    Dehydration POA: Yes    Tobacco abuse POA: Yes    Pleural effusion on left POA: Yes    Acute hypoxemic respiratory failure (HCC) POA: Yes  Resolved Problems:    * No resolved hospital problems. *      Assessment and Plan  * Hilar mass- (present on admission)  Assessment & Plan  Likely small cell.  Differential includes lymphoma, teratoma, thymoma.  Current smoker with 70 pack years  CT head/abd/pelvis ordered for staging    IR consulted for biopsy.  If IR unable to perform due to risk, will consider EBUS at [facility].  Per hospitalist note, Oncology (Dr. Gutierrez) following and rad/onc (Dr. Pyle) aware    Pleural effusion on left- (present on admission)  Assessment & Plan  Likely malignant  Unclear how much was removed; no procedure note available  Follow up fluid studies/cultures/cytology    Tobacco abuse- (present on admission)  Assessment & Plan  70 pack years  Discussed cessation            Thanks for the consult.  I'm available on Voalte.  Discussed with my attending, Dr. Diaz.      Roberto Muhammad M.D.  Internal Medicine, PGY-3

## 2023-02-21 NOTE — ED NOTES
Attempted to call report to admit RN, reports will return call.      Shantal Garcia Main transported to R3 via gurney with transporter and daughter at bedside. All personal belongings in possession.  NAD noted.

## 2023-02-21 NOTE — H&P
Hospital Medicine History & Physical Note    Date of Service  2/20/2023    Primary Care Physician  CORBY Tipton.    Consultants  oncology and pulmonary  Specialist Names: Dr. Yaritza Diaz, Dr. Joyce Gutierrez    Code Status  DNAR/DNI  Okay for intubation for procedures    Chief Complaint  Chief Complaint   Patient presents with    Shortness of Breath     Pt reports to having a paralyzed vocal cord and is scheduled tomorrow to get a CT scan done however they are concerned that the patient is losing a great deal of weight, she feels more short of breath, pts daughter also reports that she has a cough but is unable to cough out sputum due to her condition.        History of Presenting Illness  Shantal Quach is a 72 y.o. female who presented 2/20/2023 with SOB and hoarseness.  Onset September 2022, progressively worsening. Now, pt has associated dysphagia, reports decreased fluid intake and solids.  She reports some weight loss and now speaking is more difficult.  She has smoked tobacco for 50 years.  She denied history of breast cancer.  Daughter present during evaluation.    I spoke with EDP about patient's case. IV ABX given for possible pneumonia. Patient found to have large left hilar mass on CT chest.    I have spoken with Pulmonologist and Oncologist.    I discussed the plan of care with patient, family, bedside RN, charge RN, , pharmacy, and oncology and pulmonary.    Review of Systems  Review of Systems   Constitutional:  Positive for malaise/fatigue. Negative for chills and fever.   HENT:  Negative for congestion and hearing loss.    Eyes:  Negative for blurred vision and pain.   Respiratory:  Positive for cough and shortness of breath.    Cardiovascular:  Positive for orthopnea. Negative for chest pain and palpitations.   Gastrointestinal:  Negative for abdominal pain, nausea and vomiting.   Genitourinary:  Negative for dysuria and hematuria.   Musculoskeletal:  Negative for back  pain and joint pain.   Skin:  Negative for itching and rash.   Neurological:  Positive for weakness. Negative for dizziness and headaches.   Psychiatric/Behavioral:  The patient is not nervous/anxious and does not have insomnia.    All other systems reviewed and are negative.    Past Medical History  Tobacco use    Surgical History   has a past surgical history that includes tonsillectomy.     Family History  family history includes Cancer in her father and maternal aunt.   Family history reviewed with patient. There is family history that is pertinent to the chief complaint.     Social History   reports that she has been smoking cigarettes. She has a 70.00 pack-year smoking history. She has never used smokeless tobacco. She reports current alcohol use.    Allergies  No Known Allergies    Medications  Prior to Admission Medications   Prescriptions Last Dose Informant Patient Reported? Taking?   famciclovir (FAMVIR) 500 MG Tab   No No   Sig: Take 1 Tab by mouth 3 times a day.   hydrocodone-acetaminophen (NORCO) 5-325 MG Tab per tablet   No No   Sig: Take 1-2 Tabs by mouth every 6 hours as needed.      Facility-Administered Medications: None       Physical Exam  Temp:  [36.9 °C (98.4 °F)] 36.9 °C (98.4 °F)  Pulse:  [109-119] 115  Resp:  [16-48] 22  BP: (111-149)/(70-94) 125/80  SpO2:  [90 %-97 %] 96 %  Blood Pressure : 132/81   Temperature: 36.9 °C (98.4 °F)   Pulse: (!) 109   Respiration: (!) 23   Pulse Oximetry: 90 %       Physical Exam  Vitals and nursing note reviewed.   Constitutional:       General: She is not in acute distress.     Appearance: She is ill-appearing.      Comments: Frail appearing elderly female   HENT:      Head: Normocephalic and atraumatic.      Comments: Temporal muscle wasting positive     Right Ear: External ear normal.      Left Ear: External ear normal.      Mouth/Throat:      Mouth: Mucous membranes are dry.      Pharynx: No oropharyngeal exudate.   Eyes:      General: No scleral  icterus.     Extraocular Movements: Extraocular movements intact.   Neck:      Comments: Noted difference in left neck appearance, less movement compared to right side.  Cardiovascular:      Rate and Rhythm: Regular rhythm. Tachycardia present.      Pulses: Normal pulses.      Heart sounds: Normal heart sounds. No murmur heard.  Pulmonary:      Effort: Respiratory distress present.      Breath sounds: Rhonchi (bilateral) present.      Comments: Decreased breath sounds over right inferior lung field. On 2LNC. Tachypneic, increased labor to breathe.  Abdominal:      General: Abdomen is flat. Bowel sounds are normal. There is no distension.      Palpations: Abdomen is soft.      Tenderness: There is no abdominal tenderness.   Musculoskeletal:         General: No swelling or tenderness.      Cervical back: Neck supple.      Comments: Sarcopenic. Bilateral hand muscle atrophy noted.   Lymphadenopathy:      Cervical: No cervical adenopathy.   Skin:     General: Skin is warm.      Capillary Refill: Capillary refill takes less than 2 seconds.      Coloration: Skin is not jaundiced.      Findings: No erythema.   Neurological:      Mental Status: She is alert and oriented to person, place, and time. Mental status is at baseline.      Motor: Weakness present.   Psychiatric:         Mood and Affect: Mood normal.         Behavior: Behavior normal.         Thought Content: Thought content normal.         Judgment: Judgment normal.       Laboratory:  Recent Labs     02/20/23  1321   WBC 11.3*   RBC 5.32   HEMOGLOBIN 15.0   HEMATOCRIT 46.6   MCV 87.6   MCH 28.2   MCHC 32.2*   RDW 48.5   PLATELETCT 468*   MPV 9.3     Recent Labs     02/20/23  1321   SODIUM 140   POTASSIUM 3.3*   CHLORIDE 97   CO2 21   GLUCOSE 139*   BUN 12   CREATININE 0.41*   CALCIUM 9.9     Recent Labs     02/20/23  1321   ALTSGPT 9   ASTSGOT 20   ALKPHOSPHAT 103*   TBILIRUBIN 0.4   GLUCOSE 139*         No results for input(s): NTPROBNP in the last 72 hours.       No results for input(s): TROPONINT in the last 72 hours.    Imaging:  CT-CHEST (THORAX) WITH   Final Result      1.  There is a large mediastinal mass extending into the left hilar region encasing pulmonary arteries and portions of the thoracic aorta with rightward mass effect on the trachea and narrowing of the proximal bronchi and left lower lobe and lingular    segmental bronchi. Findings are consistent with malignancy with lymphoma considered most likely etiology.   2.  There is a moderate-sized left pleural effusion.   3.  Anteromedial left upper lobe airspace process most consistent postobstructive pneumonitis.   4.  Left lower lobe and lingular opacities could represent pneumonitis or atelectasis.   5.  There is underlying emphysema.         Fleischner Society pulmonary nodule recommendations:   Not Applicable         CT-SOFT TISSUE NECK WITH   Final Result      1.  There is no cervical lymphadenopathy.   2.  Airway is patent.   3.  No evidence of acute inflammation.   4.  Upper intrathoracic findings as discussed on prior chest CT.      DX-CHEST-PORTABLE (1 VIEW)   Final Result         Elevation of the diaphragm with left basilar atelectasis. Superimposed infection is possible.      Small left pleural effusion.      IR-THORACENTESIS PUNCTURE LEFT    (Results Pending)   CT-ABDOMEN-PELVIS WITH    (Results Pending)   CT-HEAD WITH    (Results Pending)   IR-CONSULT AND TREAT    (Results Pending)       X-Ray:  My impression is: evident left hilar findings, mass or consolidation, left pleural effusion  EKG:  My impression is: Sinus tachycardia, Qtc 406ms    Assessment/Plan:  Justification for Admission Status  I anticipate this patient will require at least two midnights for appropriate medical management, necessitating inpatient admission because patient has left hilar mass with highly concerning findings including encasement of pulmonary arteries and thoracic aorta with rightward mass effect of the trachea,  narrowing of the proximal bronchi and left lower lobe.  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  She will need consultation with pulmonology, radiation oncology and medical oncologist.  Given nature and size of lymphoma, treatment is warranted on inpatient.      Patient will need a oncology med/Surg bed on medicine service .  The need is secondary to aggressive lymphoma with vasculature compromise.    * Hilar mass- (present on admission)  Assessment & Plan  Patient risk factor includes 50 years tobacco smoking  CT confirming large left hilar mass with mass effect to trachea, aorta and bronchi  - admit to oncology with remote cardiac monitoring  - I have spoken with pulmonologist Dr. Diaz and Oncologist Dr. Gutierrez.  Recommendations include CT head, A/P for staging. IR consult for thoracentesis and mediastinal biopsy.  - keep NPO due to dysphagia, SLP consulted for eval  - I did speak with Dr. Pyle with rad/onc, he is aware in case any emergency arise.    Acute hypoxemic respiratory failure (HCC)- (present on admission)  Assessment & Plan  Patient with tachypnea and increased work to breathe on exam. Oxygen saturation going down to 90%. Caused by large left hilar lung mass and pleural effusion, emphysema, tobacco use.  - continue oxygen supplementation    Pleural effusion on left- (present on admission)  Assessment & Plan  Likely malignant  - IR thoracentesis ordered with labs    Tobacco abuse- (present on admission)  Assessment & Plan  50 pack year history  Spent a total of 6 minutes on tobacco cessation counseling.  Counseled patient on risks and dangers of smoking, worsening health status and potential irreversible risks.  She unfortunately has lung cancer at this time.  Patient is willing to quit at this time, agrees to nicotine patch while hospitalized   01093 (smoking and tobacco cessation counseling visit; intermediate, greater than 3 minutes up to 10 minutes)    Dehydration- (present on  admission)  Assessment & Plan  Not tolerating oral liquids at home  Pt tachycardic  - IVF    Leukocytosis- (present on admission)  Assessment & Plan  Likely due to mass  Procalcitonin negative  Will cover for potential pneumonia with unasyn and azithromycin.    Other dysphagia- (present on admission)  Assessment & Plan  Worsening dysphagia, pt not able to tolerate oral fluids or solids  - SLP consulted  - due to mass effect      VTE prophylaxis: SCDs/TEDs and pharmacologic prophylaxis contraindicated due to possible procedure required.    Total time spent on admission over 75 minutes.    This included my review with ER physician, review of ED events, patient's history, outside records, recent records, face to face interview, physical examination; my review of lab results (CBC, chemistry panel), imaging review (CXR, CT chest, neck) and ECG. Also includes counseling patient on admission, treatment plan, and documentation of encounter.  In addition, speaking with specialist pulmonology, radiation oncologist and medical oncologist.

## 2023-02-21 NOTE — ASSESSMENT & PLAN NOTE
Likely malignant  Thoracentesis 2/21 - exudate by Light's criteria  Cytology in process - poor sensitivity, but if positive would stage Lung cancer at Stage IV

## 2023-02-21 NOTE — DIETARY
"Nutrition services: Day 1 of admit.  Shantal Quach is a 72 y.o. female admitted worsening dyspnea the past 5-6 weeks. CT revealed a left mediastinal mass with left pleural effusion and postobstructive pneumonitis  PMHx significant for possible vocal cord dysfunction, DM2, DLD, and HTN (per MD notes)    Patient with unintentional weight loss, poor appetite and failure to thrive noted on admit screen.  Patient with another discipline at time of RD visit.    Assessment:  Height: 167.6 cm (5' 6\")  Weight: 57.2 kg (126 lb)  Body mass index is 20.34 kg/m².  Diet/Intake: NPO s/p swallow evaluation with SLP; FEES recommended    Evaluation:  At risk for refeeding.  K+ 3.0 and Mag 1.4 today  40# weight loss per MD note.    Malnutrition Risk: Not yet determined    Recommendations/Interventions/Plan:    Follow electrolytes for refeeding  Lab orders placed  Add thiamine if patient can swallow it 100 mg per day.    RD will interview patient and is following plan of care and available for nutrition support if indicated    "

## 2023-02-21 NOTE — ASSESSMENT & PLAN NOTE
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  Differential includes lymphoma, teratoma, thymoma.  Current smoker with 70 pack years  CT head/abd/pelvis ordered for staging    On schedule for EBUS at [facility] on Thursday, 02/23/23, at 11 AM  Plan is to DC patient today and have her follow-up tomorrow.  Discussed plan with her during rounds.  She understands she needs to be NPO starting midnight tonight.  Oncology (Dr. Gutierrez) following and rad/onc (Dr. Pyle) aware  Per Dr. Gutierrez, he will follow up with her in clinic early next week

## 2023-02-21 NOTE — ED NOTES
Med rec complete per pt's medications- reviewed with pt and returned. Family will taking medications back home

## 2023-02-21 NOTE — CONSULTS
DATE OF SERVICE:  2023     Consultation is called by Dr. Jose Tucker.     REASON FOR CONSULTATION:  Mediastinal mass in a patient with history of   smoking.     HISTORY OF PRESENT ILLNESS:  The patient is a very pleasant 72-year-old lady   who has been complaining of weight loss of about 40 pounds over the past 3-4   months along with difficulty breathing.  She was seen in the ER and a CT scan   showed a large subhilar mass.  The CT scan also revealed that the hilar mass   was encasing the pulmonary arteries and portions of the thoracic aorta with   the rightward mass effect on the trachea and narrowing of the proximal bronchi   and left lower lobe lingular segmental bronchi as well.  Due to these   reasons, a Hem/Onc consultation was called.  The patient smokes about 2 packs   of cigarettes per day.  She complains of weight loss as mentioned above.  She   denies any hemoptysis or any headaches.     PAST MEDICAL HISTORY:  History of tobacco use disorder, dyslipidemia.     PAST SURGICAL HISTORY:  Tonsillectomy, adenoidectomy, rhinoplasty.     FAMILY HISTORY:  Father  of lung cancer.  One son committed suicide.    Daughter is fairly healthy.     PERSONAL AND SOCIAL HISTORY:  Lives alone.  History of smoking 2 packs of   cigarettes per day.  About 70- to 80-pack-year history  of smoking, no history   of alcohol abuse.  Retired.     REVIEW OF SYSTEMS:  GENERAL AND CONSTITUTIONAL: Complains of fatigue.  Complains of approximately   40-pound weight loss.  No fevers or chills.  HEAD AND NECK, EARS, NOSE AND THROAT:  Denies any headaches or any visual   symptoms.  RESPIRATORY:   See history of present illness.  Has difficulty in breathing.    Has occasional cough.  Denies any hemoptysis.  Cough is nonproductive  CARDIOVASCULAR:  No chest pain or palpitations.  GASTROINTESTINAL: Occasional difficulty with swallowing.  Complains of loss of   appetite.  No nausea or vomiting.  NEUROLOGICAL:  Denies any  seizures or stroke.  Denies any weakness.  No   headaches.  PSYCHIATRIC:  Anxious, but denies any depression.  SKIN AND INTEGUMENTARY:  No rash or any bruising.  MUSCULOSKELETAL:  No back or any bony discomfort.  GENITOURINARY:  Postmenopausal.  Denies any dysuria, hematuria.     Rest of her review of systems is negative per CMS/AMA criteria unless as   mentioned in history of present or past illness.     PHYSICAL EXAMINATION:    GENERAL:  The patient is alert and oriented x3, not in any distress.  VITAL SIGNS:  Pulse is 107, temperature 36.8, blood pressure 125/68.  HEENT:  Pupils are equal.  Oral mucosa reveals no thrush.  Oropharynx appears   unremarkable.  NECK:  Supple.  There is no supraclavicular adenopathy.  LUNGS:  Clear to auscultation with good bilateral air entry.  HEART:  Reveals no S3, S4.  ABDOMEN:  Reveals no hepatosplenomegaly.  There is no tenderness.  Bowel   sounds are normally heard.  EXTREMITIES:  There is no edema of lower extremities.  NEUROLOGIC:  Shows power is equal bilaterally in both upper and lower   extremities, no neuropathy.  PSYCHIATRIC:  Anxiety, but no depression.     LABORATORY DATA:  Reviewed.     RADIOLOGICAL STUDIES:  Reviewed.     ASSESSMENT:  Lung mass.  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     PLAN:  At this time is to get EBUS-based biopsy possibly as an outpatient.  I   discussed the diagnostic possibilities with the patient in detail.  She does   not appear to be a surgical candidate upfront.  She will follow up with me in   the office.  Plan of care was reviewed with her.  Pleural effusion, plan is to   have her undergo thoracentesis. There is a good likelihood that the patient's   pleural effusion is malignant considering the fact that she does not give any   history of congestive heart failure or any other cardiac history.  Smoking   disorder.  She has 70- to 80-pack-year history of smoking.  She has a  nicotine   patch in place.  I advised her to give up smoking.  She understands the   relationship between lung cancer and a history of smoking.        ______________________________  MD KAIA Ewing/ARJUN/ERNESTO    DD:  02/21/2023 10:57  DT:  02/21/2023 11:56    Job#:  [id number]

## 2023-02-21 NOTE — DISCHARGE PLANNING
Case Management Discharge Planning    Admission Date: 2/20/2023  GMLOS: 6.6  ALOS: 1    6-Clicks ADL Score:    6-Clicks Mobility Score:        Anticipated Discharge Dispo:     DME Needed: No    Action(s) Taken: LMSW spoke with patient and daughter at bedside. Patient could not speak due to diagnosis. She would nod her head yes and no to me or her daughter would answer questions for her. Her daughter lives in [place], [place] and came to see her mother. Patient lives with brother most of the time and it was stated that he could live there full time if patient needs that support upon discharge. Patient was able to confirm all information on facesheet.    Escalations Completed: None    Medically Clear: No    Next Steps: F/U with daughter on needs, await information from medical team about procedures and discuss care plan for discharge.    Barriers to Discharge: Pending Procedures    Care Transition Team Assessment    Information Source  Orientation Level: Oriented X4, Appropriate for developmental age  Information Given By: Patient, Other (Comments) (Daughter)  Informant's Name: Eugenio  Who is responsible for making decisions for patient? : Patient    Elopement Risk  Legal Hold: No  Ambulatory or Self Mobile in Wheelchair: Yes  Disoriented: No  Psychiatric Symptoms: None  History of Wandering: No  Elopement this Admit: No  Vocalizing Wanting to Leave: No  Displays Behaviors, Body Language Wanting to Leave: No-Not at Risk for Elopement  Elopement Risk: Not at Risk for Elopement    Interdisciplinary Discharge Planning  Primary Care Physician: Shabana Lagunas APR  Lives with - Patient's Self Care Capacity: Sibling  Patient or legal guardian wants to designate a caregiver: No  Support Systems: Other (Comments) (Sibling, brother lives with her)  Housing / Facility: [address]  Mobility Issues: No  Prior Services: None  Assistance Needed: No  Durable Medical Equipment: Not Applicable    Discharge Preparedness  What is  your plan after discharge?: Uncertain - pending medical team collaboration  What are your discharge supports?: Child, Sibling  Prior Functional Level: Independent with Activities of Daily Living  Difficulity with ADLs: None  Difficulity with IADLs: None    Functional Assesment  Prior Functional Level: Independent with Activities of Daily Living    Domestic Abuse  Have you ever been the victim of abuse or violence?: No    Discharge Risks or Barriers  Discharge risks or barriers?: No  Patient risk factors:  (Smoking tobacco)

## 2023-02-21 NOTE — ASSESSMENT & PLAN NOTE
Improving after thoracentesis  Due to effusion, hilar/mediastinal mass  RT consult, IS, home oxygen ordered

## 2023-02-22 VITALS
HEIGHT: 66 IN | TEMPERATURE: 98.1 F | BODY MASS INDEX: 20.25 KG/M2 | WEIGHT: 126 LBS | HEART RATE: 109 BPM | RESPIRATION RATE: 19 BRPM | OXYGEN SATURATION: 98 % | SYSTOLIC BLOOD PRESSURE: 156 MMHG | DIASTOLIC BLOOD PRESSURE: 92 MMHG

## 2023-02-22 PROBLEM — E87.6 HYPOKALEMIA: Status: ACTIVE | Noted: 2023-02-22

## 2023-02-22 PROBLEM — E83.42 HYPOMAGNESEMIA: Status: RESOLVED | Noted: 2023-02-22 | Resolved: 2023-02-22

## 2023-02-22 PROBLEM — E86.0 DEHYDRATION: Status: RESOLVED | Noted: 2023-02-20 | Resolved: 2023-02-22

## 2023-02-22 PROBLEM — E87.6 HYPOKALEMIA: Status: RESOLVED | Noted: 2023-02-22 | Resolved: 2023-02-22

## 2023-02-22 PROBLEM — E83.42 HYPOMAGNESEMIA: Status: ACTIVE | Noted: 2023-02-22

## 2023-02-22 PROBLEM — D72.829 LEUKOCYTOSIS: Status: RESOLVED | Noted: 2023-02-20 | Resolved: 2023-02-22

## 2023-02-22 LAB
MAGNESIUM SERPL-MCNC: 1.8 MG/DL (ref 1.5–2.5)
PHOSPHATE SERPL-MCNC: 2.6 MG/DL (ref 2.5–4.5)
POTASSIUM SERPL-SCNC: 3.9 MMOL/L (ref 3.6–5.5)
RHODAMINE-AURAMINE STN SPEC: NORMAL
SIGNIFICANT IND 70042: NORMAL
SITE SITE: NORMAL
SOURCE SOURCE: NORMAL

## 2023-02-22 PROCEDURE — 84100 ASSAY OF PHOSPHORUS: CPT

## 2023-02-22 PROCEDURE — A9270 NON-COVERED ITEM OR SERVICE: HCPCS | Performed by: STUDENT IN AN ORGANIZED HEALTH CARE EDUCATION/TRAINING PROGRAM

## 2023-02-22 PROCEDURE — 36415 COLL VENOUS BLD VENIPUNCTURE: CPT

## 2023-02-22 PROCEDURE — 99239 HOSP IP/OBS DSCHRG MGMT >30: CPT | Performed by: STUDENT IN AN ORGANIZED HEALTH CARE EDUCATION/TRAINING PROGRAM

## 2023-02-22 PROCEDURE — 99223 1ST HOSP IP/OBS HIGH 75: CPT | Performed by: RADIOLOGY

## 2023-02-22 PROCEDURE — 99232 SBSQ HOSP IP/OBS MODERATE 35: CPT | Mod: GC | Performed by: INTERNAL MEDICINE

## 2023-02-22 PROCEDURE — 94760 N-INVAS EAR/PLS OXIMETRY 1: CPT

## 2023-02-22 PROCEDURE — 700102 HCHG RX REV CODE 250 W/ 637 OVERRIDE(OP): Performed by: STUDENT IN AN ORGANIZED HEALTH CARE EDUCATION/TRAINING PROGRAM

## 2023-02-22 PROCEDURE — A9270 NON-COVERED ITEM OR SERVICE: HCPCS | Performed by: INTERNAL MEDICINE

## 2023-02-22 PROCEDURE — 700102 HCHG RX REV CODE 250 W/ 637 OVERRIDE(OP): Performed by: INTERNAL MEDICINE

## 2023-02-22 PROCEDURE — 84132 ASSAY OF SERUM POTASSIUM: CPT

## 2023-02-22 PROCEDURE — 83735 ASSAY OF MAGNESIUM: CPT

## 2023-02-22 RX ORDER — NICOTINE 21 MG/24HR
1 PATCH, TRANSDERMAL 24 HOURS TRANSDERMAL EVERY 24 HOURS
Qty: 14 PATCH | Refills: 0 | Status: SHIPPED | OUTPATIENT
Start: 2023-02-22 | End: 2023-03-08

## 2023-02-22 RX ORDER — NICOTINE 21 MG/24HR
1 PATCH, TRANSDERMAL 24 HOURS TRANSDERMAL EVERY 24 HOURS
Qty: 14 PATCH | Refills: 0 | Status: SHIPPED | OUTPATIENT
Start: 2023-03-09 | End: 2023-03-23

## 2023-02-22 RX ORDER — POTASSIUM CHLORIDE 20 MEQ/1
40 TABLET, EXTENDED RELEASE ORAL DAILY
Qty: 60 TABLET | Refills: 0 | Status: SHIPPED | OUTPATIENT
Start: 2023-02-22 | End: 2024-03-26

## 2023-02-22 RX ORDER — LANOLIN ALCOHOL/MO/W.PET/CERES
400 CREAM (GRAM) TOPICAL DAILY
Status: DISCONTINUED | OUTPATIENT
Start: 2023-02-22 | End: 2023-02-22 | Stop reason: HOSPADM

## 2023-02-22 RX ORDER — POTASSIUM CHLORIDE 20 MEQ/1
40 TABLET, EXTENDED RELEASE ORAL DAILY
Status: DISCONTINUED | OUTPATIENT
Start: 2023-02-22 | End: 2023-02-22 | Stop reason: HOSPADM

## 2023-02-22 RX ORDER — OMEPRAZOLE 20 MG/1
20 CAPSULE, DELAYED RELEASE ORAL 2 TIMES DAILY
Qty: 60 CAPSULE | Refills: 0 | Status: SHIPPED | OUTPATIENT
Start: 2023-02-22 | End: 2024-03-26

## 2023-02-22 RX ORDER — LANOLIN ALCOHOL/MO/W.PET/CERES
400 CREAM (GRAM) TOPICAL DAILY
Qty: 30 TABLET | Refills: 0 | Status: SHIPPED | OUTPATIENT
Start: 2023-02-22 | End: 2024-03-26

## 2023-02-22 RX ADMIN — NICOTINE 14 MG: 14 PATCH TRANSDERMAL at 05:11

## 2023-02-22 RX ADMIN — LISINOPRIL 5 MG: 2.5 TABLET ORAL at 05:12

## 2023-02-22 RX ADMIN — POTASSIUM CHLORIDE 40 MEQ: 1500 TABLET, EXTENDED RELEASE ORAL at 10:28

## 2023-02-22 RX ADMIN — Medication 400 MG: at 10:28

## 2023-02-22 RX ADMIN — OMEPRAZOLE 20 MG: 20 CAPSULE, DELAYED RELEASE ORAL at 05:12

## 2023-02-22 ASSESSMENT — ENCOUNTER SYMPTOMS
SPUTUM PRODUCTION: 1
HEMOPTYSIS: 0
HEARTBURN: 0
DEPRESSION: 0
NERVOUS/ANXIOUS: 0
ABDOMINAL PAIN: 0
EYE PAIN: 0
BRUISES/BLEEDS EASILY: 0
CONSTIPATION: 0
NECK PAIN: 0
TINGLING: 0
FLANK PAIN: 0
SORE THROAT: 0
COUGH: 0
COUGH: 1
NAUSEA: 0
VOMITING: 0
SINUS PAIN: 0
HEADACHES: 0
SHORTNESS OF BREATH: 0
DIZZINESS: 0
DIARRHEA: 0
PALPITATIONS: 0
CHILLS: 0
MYALGIAS: 0
WEIGHT LOSS: 1
STRIDOR: 1
BLOOD IN STOOL: 0
BACK PAIN: 0
FEVER: 0
NERVOUS/ANXIOUS: 1

## 2023-02-22 NOTE — DISCHARGE PLANNING
LMSW spoke with patient at bedside and received choice for [facility] DME for oxygen. Choice form has been faxed to [facility].   09-Oct-2022 18:00

## 2023-02-22 NOTE — CARE PLAN
The patient is Watcher - Medium risk of patient condition declining or worsening    Shift Goals  Clinical Goals: pt will have potassium at 3.5 or greater  Patient Goals: pt will rest comfortably through the night  Family Goals: not present at this time    Progress made toward(s) clinical / shift goals: pt's potassium is up to 3.9; pt has been resting comfortably through the night    Patient is not progressing towards the following goals: N/A      Problem: Respiratory  Goal: Patient will achieve/maintain optimum respiratory ventilation and gas exchange  Outcome: Progressing     Problem: Self Care  Goal: Patient will have the ability to perform ADLs independently or with assistance (bathe, groom, dress, toilet and feed)  Outcome: Progressing     Problem: Knowledge Deficit - Standard  Goal: Patient and family/care givers will demonstrate understanding of plan of care, disease process/condition, diagnostic tests and medications  Outcome: Progressing     Problem: Fall Risk  Goal: Patient will remain free from falls  Outcome: Progressing

## 2023-02-22 NOTE — DISCHARGE INSTRUCTIONS
Discharge Instructions per Neo Song M.D.    You were hospitalized for a mass in your chest (hilar mass) which is compressing your esophagus and causing reflux.  You will undergo a biopsy in your lungs (endoscopic biopsy with ultrasound) for further diagnosis and then follow up with an oncologist (Dr. Gutierrez).    Fluid was removed from your lung (thoracentesis). This seems to be due to cancer in the chest (malignant effusion).    DIET: Regular    ACTIVITY: Regular    DIAGNOSIS: Esophageal Dysphagia and Malignant Effusion due to Hilar / Mediastinal Mass    Return to ER if you faint for any reason, develop sudden chest pain or shortness of breath, or develop bleeding in your cough.    Home Oxygen Use, Adult  When a medical condition keeps you from getting enough oxygen, your health care provider may instruct you to take extra oxygen at home. Your health care provider will let you know:  When to take oxygen.  For how long to take oxygen.  How quickly oxygen should be delivered (flow rate), in liters per minute (LPM or L/M).  Home oxygen can be given through:  A mask.  A nasal cannula. This is a device or tube that goes in the nostrils.  A transtracheal catheter. This is a small, flexible tube placed in the trachea.  A tracheostomy. This is a surgically made opening in the trachea.  These devices are connected with tubing to an oxygen source, such as:  A tank. Tanks hold oxygen in gas form. They must be replaced when the oxygen is used up.  A liquid oxygen device. This holds oxygen in liquid form. It must be replaced when the oxygen is used up.  An oxygen concentrator machine. This filters oxygen in the room. It uses electricity, so you must have a backup cylinder of oxygen in case the power goes out.  Supplies needed:  To use oxygen, you will need:  A mask, nasal cannula, transtracheal catheter, or tracheostomy.  An oxygen tank, a liquid oxygen device, or an oxygen concentrator.  The tape that your health care  provider recommends (optional).  If you use a transtracheal catheter and your prescribed flow rate is 1 LPM or greater, you will also need a humidifier.  Risks and complications  Fire. This can happen if the oxygen is exposed to a heat source, flame, or spark.  Injury to skin. This can happen if liquid oxygen touches your skin.  Organ damage. This can happen if you get too little oxygen.  How to use oxygen  Your health care provider or a representative from your medical device company will show you how to use your oxygen device. Follow her or his instructions. The instructions may look something like this:  Wash your hands.  If you use an oxygen concentrator, make sure it is plugged in.  Place one end of the tube into the port on the tank, device, or machine.  Place the mask over your nose and mouth. Or, place the nasal cannula and secure it with tape if instructed. If you use a tracheostomy or transtracheal catheter, connect it to the oxygen source as directed.  Make sure the liter-flow setting on the machine is at the level prescribed by your health care provider.  Turn on the machine or adjust the knob on the tank or device to the correct liter-flow setting.  When you are done, turn off and unplug the machine, or turn the knob to OFF.  How to clean and care for the oxygen supplies  Nasal cannula  Clean it with a warm, wet cloth daily or as needed.  Wash it with a liquid soap once a week.  Rinse it thoroughly once or twice a week.  Replace it every 2-4 weeks.  If you have an infection, such as a cold or pneumonia, change the cannula when you get better.  Mask  Replace it every 2-4 weeks.  If you have an infection, such as a cold or pneumonia, change the mask when you get better.  Humidifier bottle  Wash the bottle between each refill:  Wash it with soap and warm water.  Rinse it thoroughly.  Disinfect it and its top.  Air-dry it.  Make sure it is dry before you refill it.  Oxygen concentrator  Clean the air filter  "at least twice a week according to directions from your home medical equipment and service company.  Wipe down the cabinet every day. To do this:  Unplug the unit.  Wipe down the cabinet with a damp cloth.  Dry the cabinet.  Other equipment  Change any extra tubing every 1-3 months.  Follow instructions from your health care provider about taking care of any other equipment.  Safety tips  Fire safety tips    Keep your oxygen and oxygen supplies at least 5 ft away from sources of heat, flames, and maki at all times.  Do not allow smoking near your oxygen. Put up \"no smoking\" signs in your home. Avoid smoking areas when in public.  Do not use materials that can burn (are flammable) while you use oxygen.  When you go to a restaurant with portable oxygen, ask to be seated in the nonsmoking section.  Keep a fire extinguisher close by. Let your fire department know that you have oxygen in your home.  Test your home smoke detectors regularly.  Traveling  Secure your oxygen tank in the vehicle so that it does not move around. Follow instructions from your medical device company about how to safely secure your tank.  Make sure you have enough oxygen for the amount of time you will be away from home.  If you are planning air travel, contact the airline to find out if they allow the use of an approved portable oxygen concentrator. You may also need documents from your health care provider and medical device company before you travel.  General safety tips  If you use an oxygen cylinder, make sure it is in a stand or secured to an object that will not move (fixed object).  If you use liquid oxygen, make sure its container is kept upright.  If you use an oxygen concentrator:  Tell your electric company. Make sure you are given priority service in the event that your power goes out.  Avoid using extension cords, if possible.  Follow these instructions at home:  Use oxygen only as told by your health care provider.  Do not use " alcohol or other drugs that make you relax (sedating drugs) unless instructed. They can slow down your breathing rate and make it hard to get in enough oxygen.  Know how and when to order a refill of oxygen.  Always keep a spare tank of oxygen. Plan ahead for holidays when you may not be able to get a prescription filled.  Use water-based lubricants on your lips or nostrils. Do not use oil-based products like petroleum jelly.  To prevent skin irritation on your cheeks or behind your ears, tuck some gauze under the tubing.  Contact a health care provider if:  You get headaches often.  You have shortness of breath.  You have a lasting cough.  You have anxiety.  You are sleepy all the time.  You develop an illness that affects your breathing.  You cannot exercise at your regular level.  You are restless.  You have difficult or irregular breathing, and it is getting worse.  You have a fever.  You have persistent redness under your nose.  Get help right away if:  You are confused.  You have blue lips or fingernails.  You are struggling to breathe.  Summary  Your health care provider or a representative from your medical device company will show you how to use your oxygen device. Follow her or his instructions.  If you use an oxygen concentrator, make sure it is plugged in.  Make sure the liter-flow setting on the machine is at the level prescribed by your health care provider.  Keep your oxygen and oxygen supplies at least 5 ft away from sources of heat, flames, and maki at all times.  This information is not intended to replace advice given to you by your health care provider. Make sure you discuss any questions you have with your health care provider.  Document Released: 03/09/2005 Document Revised: 06/06/2019 Document Reviewed: 07/11/2017  Elsevier Patient Education © 2020 [employer]

## 2023-02-22 NOTE — DISCHARGE PLANNING
Received Choice form at 0937  Agency/Facility Name: [facility]  Referral sent per Choice form @ 4527     1150:  Agency/Facility Name: [facility]   Spoke To: Louis   Outcome: Per Louis, order has been processed and will deliver O2 shortly.    1245:  Agency/Facility Name: [facility]   Spoke To: Nafisa   Outcome: [facility] notified [facility] that another order for a bed and walker was sent shortly. [facility] will follow up with [facility] on other DME status.     RN JEREMÍAS Arauz notified.

## 2023-02-22 NOTE — PROGRESS NOTES
"Hospital Medicine Daily Progress Note    Date of Service  2/21/2023    Chief Complaint  Shantal Quach is a 72 y.o. female with tobacco use DO admitted 2/20/2023 with AHRF and Left obstructing hilar mass.    Hospital Course  No notes on file    Interval Problem Update    2/21: She is aware she got \"bad news.\" Thoracentesis today with ~900 cc of removal. She has post-procedure cough and will utilize flutter valve / IS. She denies pain, hemoptysis, N/V, F/C. Evaluated by SLP and underwent FEES which showed lower esophageal regurgitation. 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 She would like to eat as she wants despite risk. Antibiotics discontinued after negative procalcitonin. POC discussed with pulmonologist Dr. Diaz, who is coordinating EBUS for Thursday morning. POC discussed with oncologist DR. Gutierrez, who was in agreement with completing staging Cts, coordination of lung biopsy as an outpatient, and follow up in clinic for further management.     I have discussed this patient's plan of care and discharge plan at IDT rounds today with Case Management, Nursing, Nursing leadership, and other members of the IDT team.    Consultants/Specialty  oncology and pulmonary    Code Status  DNAR/DNI    Disposition  Patient is not medically cleared for discharge.   Anticipate discharge to to home with close outpatient follow-up.  I have placed the appropriate orders for post-discharge needs.    Review of Systems  Review of Systems   Constitutional:  Positive for weight loss. Negative for chills, fever and malaise/fatigue.   HENT:  Negative for ear pain, nosebleeds, sinus pain and sore throat.    Eyes:  Negative for pain.   Respiratory:  Positive for cough. Negative for hemoptysis and shortness of breath.    Cardiovascular:  Negative for chest pain.   Gastrointestinal:  Negative for abdominal pain, " blood in stool, constipation, diarrhea, melena, nausea and vomiting.   Genitourinary:  Negative for dysuria, flank pain and hematuria.   Musculoskeletal:  Negative for back pain, joint pain, myalgias and neck pain.   Neurological:  Negative for headaches.   Endo/Heme/Allergies:  Does not bruise/bleed easily.   Psychiatric/Behavioral:  Negative for depression. The patient is not nervous/anxious.       Physical Exam  Temp:  [35.8 °C (96.5 °F)-36.6 °C (97.8 °F)] 36.1 °C (97 °F)  Pulse:  [100-120] 115  Resp:  [16-20] 16  BP: (124-154)/(68-96) 154/88  SpO2:  [91 %-100 %] 99 %    Physical Exam  Vitals and nursing note reviewed. Exam conducted with a chaperone present (Daughter at bedside).   Constitutional:       Appearance: She is cachectic.      Interventions: Nasal cannula in place.   HENT:      Head:      Comments: Bitemporal and bibuccal wasting     Nose: Nose normal.      Mouth/Throat:      Mouth: Mucous membranes are dry.   Eyes:      General: No scleral icterus.     Conjunctiva/sclera: Conjunctivae normal.   Cardiovascular:      Rate and Rhythm: Normal rate and regular rhythm.   Pulmonary:      Effort: No respiratory distress.      Breath sounds: Rhonchi (Diffuse) present. No wheezing or rales.      Comments: Increased respiratory effort  Chest:      Comments: Left posterior thoracentesis site c/d/i  Abdominal:      General: Bowel sounds are normal. There is no distension.      Palpations: Abdomen is soft.      Tenderness: There is no abdominal tenderness. There is no guarding or rebound.   Genitourinary:     Comments: No butler  Musculoskeletal:      Cervical back: Neck supple.      Right lower leg: No edema.      Left lower leg: No edema.   Skin:     General: Skin is warm and dry.   Neurological:      Comments: Appropriately conversant   Psychiatric:         Mood and Affect: Mood normal.         Behavior: Behavior normal.         Thought Content: Thought content normal.         Judgment: Judgment normal.        Fluids  No intake or output data in the 24 hours ending 02/21/23 1729    Laboratory  Recent Labs     02/20/23  1321 02/21/23  0043   WBC 11.3* 8.9   RBC 5.32 4.92   HEMOGLOBIN 15.0 14.1   HEMATOCRIT 46.6 43.0   MCV 87.6 87.4   MCH 28.2 28.7   MCHC 32.2* 32.8*   RDW 48.5 49.3   PLATELETCT 468* 416   MPV 9.3 9.6     Recent Labs     02/20/23  1321 02/21/23  0043   SODIUM 140 142   POTASSIUM 3.3* 3.0*   CHLORIDE 97 100   CO2 21 23   GLUCOSE 139* 121*   BUN 12 9   CREATININE 0.41* 0.41*   CALCIUM 9.9 9.5     Recent Labs     02/21/23  0735   INR 1.09               Imaging  CT-HEAD WITH & W/O   Final Result      No evidence of acute cerebral infarction, hemorrhage, mass lesion, or abnormal enhancement.      Moderate white matter hypodensity is present.  This is a nonspecific finding which usually is found to represent chronic microvascular disease in patient's of this demographic.  Demyelination, age indeterminant ischemia and gliosis are also common    possibilities.      Age-appropriate atrophy      CT-ABDOMEN-PELVIS WITH   Final Result      1.  No CT evidence of abdominopelvic metastatic disease      2.  Borderline hepatomegaly      3.  Cholelithiasis      4.  Colonic diverticulosis without evidence of diverticulitis      5.  Left hemidiaphragm elevation with small left effusion      US-THORACENTESIS PUNCTURE LEFT   Final Result      1. Ultrasound guided left sided diagnostic and therapeutic thoracentesis.      2. 900 mL of fluid withdrawn.      DX-CHEST-PORTABLE (1 VIEW)   Final Result      1.  Small left pleural effusion with elevation of the left hemidiaphragm. No evidence of left-sided pneumothorax status post thoracentesis.      2.  Ectasia of the thoracic aorta.      3.  Prominence of the left pulmonary hilum which may be secondary to adenopathy/mass.      CT-CHEST (THORAX) WITH   Final Result      1.  There is a large mediastinal mass extending into the left hilar region encasing pulmonary arteries and  portions of the thoracic aorta with rightward mass effect on the trachea and narrowing of the proximal bronchi and left lower lobe and lingular    segmental bronchi. Findings are consistent with malignancy with lymphoma considered most likely etiology.   2.  There is a moderate-sized left pleural effusion.   3.  Anteromedial left upper lobe airspace process most consistent postobstructive pneumonitis.   4.  Left lower lobe and lingular opacities could represent pneumonitis or atelectasis.   5.  There is underlying emphysema.         Fleischner Society pulmonary nodule recommendations:   Not Applicable         CT-SOFT TISSUE NECK WITH   Final Result      1.  There is no cervical lymphadenopathy.   2.  Airway is patent.   3.  No evidence of acute inflammation.   4.  Upper intrathoracic findings as discussed on prior chest CT.      DX-CHEST-PORTABLE (1 VIEW)   Final Result         Elevation of the diaphragm with left basilar atelectasis. Superimposed infection is possible.      Small left pleural effusion.           Assessment/Plan  * Hilar mass- (present on admission)  Assessment & Plan  Patient risk factor includes 50 years tobacco smoking  CT confirming large left hilar mass with mass effect to trachea, aorta and bronchi  Pulmonology consulted, coordinating outpatient EBUS for Thursday 2/23  Oncology consulted, completing stating CTs and coordinating outpatient biopsy prior to [facility] in [facility]  [facility] pending tissue diagnosis, no emergent need for XRT    Hypokalemia- (present on admission)  Assessment & Plan  Due to inadequate PO intake  Kdur 40 x1, 1 L NS + 40 KCl  Repeat AM K    Hypomagnesemia- (present on admission)  Assessment & Plan  Due to inadequate PO intake  IV Mg 2g    Acute hypoxemic respiratory failure (HCC)- (present on admission)  Assessment & Plan  Improving after thoracentesis  Due to effusion, hilar/mediastinal mass  RT consult, IS, home oxygen ordered    Pleural  effusion on left- (present on admission)  Assessment & Plan  Likely malignant  Thoracentesis 2/21 - exudate by Light's criteria  Cytology in process - poor sensitivity, but if positive would stage Lung cancer at Stage IV    Tobacco abuse- (present on admission)  Assessment & Plan  50 pack year history  Patient is willing to quit at this time, agrees to nicotine patch while hospitalized     Dehydration- (present on admission)  Assessment & Plan  Due to poor PO intake  IV 1 L NS + 40 KCl    Leukocytosis- (present on admission)  Assessment & Plan  Likely due to mass  Procalcitonin negative  Antibiotics discontinued    Esophageal dysphagia- (present on admission)  Assessment & Plan  SLP consulted, underwent FEES with visualized LES reflux likely due to distort ation by mediastinal mass  She has opted against PEG placement and to eat despite risk         VTE prophylaxis: SCDs/TEDs and pharmacologic prophylaxis contraindicated due to thoracentesis and possible EBUS    I have performed a physical exam and reviewed and updated ROS and Plan today (2/21/2023). In review of yesterday's note (2/20/2023), there are no changes except as documented above.

## 2023-02-22 NOTE — DISCHARGE PLANNING
LMSW spoke with [facility] representative, Isidra, at 330pm. Isidra stated that they have received referalls for bed and front wheel walker. Isidra tried to call Eugenio (patients daughter) to verify drop off times and procedures and she did not answer. Isidra is going to try again and LMSW spoke with patient to let her daughter know to be anticipating that call for set up.

## 2023-02-22 NOTE — PROGRESS NOTES
Oncology/Hematology Progress Note               Author: Joyce Gutierrez M.D. Date & Time created: 2/22/2023  11:13 AM   Diagnosis-likely lung cancer  History of tobacco abuse  Interval History:  Patient is feeling better than yesterday.  Better hydration status.  No headaches.  Able to eat better.  Daughter and daughter-in-law at the bedside  Review of Systems:  Review of Systems   Constitutional:  Positive for malaise/fatigue and weight loss.   Respiratory:  Positive for cough and sputum production.    Cardiovascular:  Negative for chest pain and leg swelling.   Gastrointestinal:  Negative for abdominal pain, heartburn, nausea and vomiting.   Musculoskeletal:  Negative for joint pain and myalgias.   Neurological:  Negative for dizziness, tingling and headaches.   Psychiatric/Behavioral:  Negative for depression. The patient is nervous/anxious.      Physical Exam:  Physical Exam  Constitutional:       Appearance: She is ill-appearing.   HENT:      Head: Normocephalic and atraumatic.   Cardiovascular:      Rate and Rhythm: Normal rate and regular rhythm.      Pulses: Normal pulses.      Heart sounds: Normal heart sounds. No murmur heard.    No gallop.   Pulmonary:      Effort: Pulmonary effort is normal. No respiratory distress.      Breath sounds: Wheezing present.   Abdominal:      General: Abdomen is flat. Bowel sounds are normal. There is no distension.      Palpations: Abdomen is soft.      Tenderness: There is no abdominal tenderness. There is no guarding.   Musculoskeletal:         General: No swelling or deformity. Normal range of motion.   Neurological:      General: No focal deficit present.      Mental Status: She is alert and oriented to person, place, and time.       Labs:          Recent Labs     02/20/23  1321 02/21/23  0043 02/22/23  0023   SODIUM 140 142  --    POTASSIUM 3.3* 3.0* 3.9   CHLORIDE 97 100  --    CO2 21 23  --    BUN 12 9  --    CREATININE 0.41* 0.41*  --    MAGNESIUM  --  1.4* 1.8    PHOSPHORUS  --   --  2.6   CALCIUM 9.9 9.5  --      Recent Labs     23  1321 23  0043   ALTSGPT 9  --    ASTSGOT 20  --    ALKPHOSPHAT 103*  --    TBILIRUBIN 0.4  --    GLUCOSE 139* 121*     Recent Labs     23  1321 23  0043 23  0735   RBC 5.32 4.92  --    HEMOGLOBIN 15.0 14.1  --    HEMATOCRIT 46.6 43.0  --    PLATELETCT 468* 416  --    PROTHROMBTM  --   --  14.0   INR  --   --  1.09     Recent Labs     23  1321 23  0043   WBC 11.3* 8.9   NEUTSPOLYS 75.70*  --    LYMPHOCYTES 14.90*  --    MONOCYTES 7.90  --    EOSINOPHILS 0.40  --    BASOPHILS 0.60  --    ASTSGOT 20  --    ALTSGPT 9  --    ALKPHOSPHAT 103*  --    TBILIRUBIN 0.4  --      Recent Labs     23  1321 23  0043 23  0023   SODIUM 140 142  --    POTASSIUM 3.3* 3.0* 3.9   CHLORIDE 97 100  --    CO2  23  --    GLUCOSE 139* 121*  --    BUN 12 9  --    CREATININE 0.41* 0.41*  --    CALCIUM 9.9 9.5  --      Hemodynamics:  Temp (24hrs), Av.4 °C (97.5 °F), Min:36.1 °C (97 °F), Max:37.1 °C (98.8 °F)  Temperature: 36.7 °C (98 °F)  Pulse  Av.6  Min: 100  Max: 120   Blood Pressure : (!) 155/95 (RN notified )     Respiratory:    Respiration: 19, Pulse Oximetry: 99 %     Work Of Breathing / Effort: Mild  RUL Breath Sounds: Diminished, RML Breath Sounds: Diminished, RLL Breath Sounds: Crackles, PARKER Breath Sounds: Expiratory Wheezes, LLL Breath Sounds: Expiratory Wheezes;Diminished  Fluids:  No intake or output data in the 24 hours ending 23 1113     GI/Nutrition:  Orders Placed This Encounter   Procedures    Diet Order Diet: Regular     Standing Status:   Standing     Number of Occurrences:   1     Order Specific Question:   Diet:     Answer:   Regular [1]     Medical Decision Making, by Problem:  Active Hospital Problems    Diagnosis     *Hilar mass [R91.8]     Hypomagnesemia [E83.42]     Hypokalemia [E87.6]     Esophageal dysphagia [R13.19]     Leukocytosis [D72.829]     Dehydration [E86.0]      Tobacco abuse [Z72.0]     Pleural effusion on left [J90]     Acute hypoxemic respiratory failure (HCC) [J96.01]        Plan:  Likely lung cancer-considering her radiological findings and her history of smoking, the patient most likely has a lung cancer.  Not clear whether this would be a small cell or non-small cell.  Sodium level is acceptable.  She is getting EBUS directed therapy tomorrow.  Patient will follow-up with me in the office and we will decide in the future course of action.  CT of the abdomen does not show any metastasis and was reviewed with the patient's family members.  Patient will need a PET scan as an outpatient.  She may also need an brain MRI.  Difficulty swallowing this is better we will continue to observe her  Respiratory status this is better.  She is on oxygen supplementation.  Smoking disorder-she is currently not smoking but has a nicotine patch I talked to her about relationship between smoking and development of lung cancer.  I have advised her complete abstinence from smoking in the future.    Quality-Core Measures

## 2023-02-22 NOTE — PROGRESS NOTES
Pulmonary Progress Note      Date of Consult: 2/20/2023    Resident:  Roberto Muhammad M.D.   Attending:  Dr. Diaz    Reason for Consultation  Hilar mass    History of Presenting Illness  Shantal Quach is a 72 y.o. female current 2 ppd smoker of 70 pack years with a PMHx significant for ?vocal cord dysfunction, DM2 not on insulin, DLD, and HTN who presented on 2/20/2023 with worsening dyspnea the past 5-6 weeks.  CT revealed a left mediastinal mass with left pleural effusion and postobstructive pneumonitis.  Procal <0.05.  Flu/RSV/COVID swab negative.  She underwent left thoracentesis earlier today.  40 lb unintentional weight loss over the past 4 months.  She feels due to appetite loss as well as difficulty swallowing.  Patient states she was diagnosed with vocal cord dysfunction recently by ENT.  Family history of lung cancer in her father.    Interval History  02/21/23:  Left thoracentesis 900 mL, lymphs 91%, exudate by 3/3 Light's.  02/22/23:  Yesterday, severe pharyngeal-esophageal dysphagia per SLP eval.  Refused PEG.  CT A/P neg for abd/pelvic mets.  Per patient plan is to DC later today.  Scheduled for bronchoscopy tomorrow at 11 AM at [facility].    Review of Systems   Constitutional:  Positive for malaise/fatigue. Negative for chills and fever.   HENT:          +hoarseness   Respiratory:  Positive for stridor. Negative for cough and shortness of breath.    Cardiovascular:  Negative for chest pain and palpitations.      Vitals  Temp:  [36.1 °C (97 °F)-37.1 °C (98.8 °F)] 36.7 °C (98 °F)  Pulse:  [103-115] 104  Resp:  [16-19] 19  BP: (124-155)/(68-96) 155/95  SpO2:  [95 %-100 %] 99 %  O2 therapy: Pulse Oximetry: 99 %, O2 (LPM): 2, O2 Delivery Device: Silicone Nasal Cannula          I/O's  Intake/Output                               02/20/23 0700 - 02/21/23 0659 02/21/23 0700 - 02/22/23 0659 02/22/23 0700 - 02/23/23 0659     [id number] 4873-9126 Total 9221-9363 4794-5092 Total 0700-1859 1900-0659  Total                    Intake    Total Intake -- -- -- -- -- -- -- -- --       Output    Urine  --  -- --  --  -- --  --  -- --    Number of Times Voided -- -- -- 2 x 4 x 6 x -- -- --    Total Output -- -- -- -- -- -- -- -- --       Net I/O     -- -- -- -- -- -- -- -- --             Physical Exam  Physical Exam  Constitutional:       General: She is not in acute distress.     Comments: Frail, elderly female  +hoarse voice   HENT:      Head: Normocephalic and atraumatic.      Right Ear: External ear normal.      Left Ear: External ear normal.      Mouth/Throat:      Mouth: Mucous membranes are moist.   Eyes:      General: No scleral icterus.     Conjunctiva/sclera: Conjunctivae normal.   Cardiovascular:      Rate and Rhythm: Regular rhythm. Tachycardia present.   Pulmonary:      Effort: Pulmonary effort is normal. No respiratory distress.      Breath sounds: Stridor present. No wheezing or rales.   Abdominal:      Palpations: Abdomen is soft.      Tenderness: There is no abdominal tenderness.   Musculoskeletal:      Right lower leg: No edema.      Left lower leg: No edema.   Skin:     General: Skin is warm and dry.   Neurological:      Mental Status: She is alert and oriented to person, place, and time.      Coordination: Coordination normal.   Psychiatric:         Behavior: Behavior normal.         Thought Content: Thought content normal.       Labs      Recent Labs     02/20/23  1321 02/21/23  0043 02/22/23  0023   SODIUM 140 142  --    POTASSIUM 3.3* 3.0* 3.9   CHLORIDE 97 100  --    CO2 21 23  --    BUN 12 9  --    CREATININE 0.41* 0.41*  --    MAGNESIUM  --  1.4* 1.8   PHOSPHORUS  --   --  2.6   CALCIUM 9.9 9.5  --      Recent Labs     02/20/23  1321 02/21/23  0043   ALTSGPT 9  --    ASTSGOT 20  --    ALKPHOSPHAT 103*  --    TBILIRUBIN 0.4  --    GLUCOSE 139* 121*     Recent Labs     02/20/23  1321 02/21/23  0043 02/21/23  0735   RBC 5.32 4.92  --    HEMOGLOBIN 15.0 14.1  --    HEMATOCRIT 46.6 43.0  --     PLATELETCT 468* 416  --    PROTHROMBTM  --   --  14.0   INR  --   --  1.09     Recent Labs     02/20/23  1321 02/21/23  0043   WBC 11.3* 8.9   NEUTSPOLYS 75.70*  --    LYMPHOCYTES 14.90*  --    MONOCYTES 7.90  --    EOSINOPHILS 0.40  --    BASOPHILS 0.60  --    ASTSGOT 20  --    ALTSGPT 9  --    ALKPHOSPHAT 103*  --    TBILIRUBIN 0.4  --        Imaging  I have reviewed the relevant diagnostic imaging.    Microbiology  I have reviewed the relevant microbiology.    Problem List  Principal Problem:    Hilar mass POA: Yes  Active Problems:    Esophageal dysphagia POA: Yes    Leukocytosis POA: Yes    Dehydration POA: Yes    Tobacco abuse POA: Yes    Pleural effusion on left POA: Yes    Acute hypoxemic respiratory failure (HCC) POA: Yes    Hypomagnesemia POA: Yes    Hypokalemia POA: Yes  Resolved Problems:    * No resolved hospital problems. *      Assessment and Plan  * Hilar mass- (present on admission)  Assessment & Plan  Likely small cell.  Differential includes lymphoma, teratoma, thymoma.  Current smoker with 70 pack years  CT head/abd/pelvis ordered for staging    On schedule for EBUS at [facility] on Thursday, 02/23/23, at 11 AM  Plan is to DC patient today and have her follow-up tomorrow.  Discussed plan with her during rounds.  She understands she needs to be NPO starting midnight tonight.  Oncology (Dr. Gutierrez) following and rad/onc (Dr. Pyle) aware  Per Dr. Gutierrez, he will follow up with her in clinic early next week    Pleural effusion on left- (present on admission)  Assessment & Plan  Likely malignant  Unclear how much was removed; no procedure note available  Exudate 3/3 Light's, 91% Lymphs, cultures ngtd  Follow up cultures/cytology    Tobacco abuse- (present on admission)  Assessment & Plan  70 pack years  Discussed cessation            Thanks for the consult.  I'm available on Voalte.  Discussed with my attending, Dr. Diaz.      Roberto Muhammad M.D.  Internal Medicine, PGY-3

## 2023-02-22 NOTE — FACE TO FACE
Face to Face Note  -  Durable Medical Equipment    Neo Song M.D. - NPI: [id number]  I certify that this patient is under my care and that they had a durable medical equipment(DME)face to face encounter by myself that meets the physician DME face-to-face encounter requirements with this patient on:    Date of encounter:   Patient:                    MRN:                       YOB: 2022  Shantal Quach  [id number]  1950     The encounter with the patient was in whole, or in part, for the following medical condition, which is the primary reason for durable medical equipment:  Other - Stage IV Lung Cancer, Severe Protein-Calorie Malnutrition, Fatigue    I certify that, based on my findings, the following durable medical equipment is medically necessary:    Beds.    My Clinical findings support the need for the above equipment due to:  Abnormal Gait, Hypoxia, Dysphagia

## 2023-02-22 NOTE — CONSULTS
RADIATION ONCOLOGY CONSULT    DATE OF SERVICE: 2/22/2023    IDENTIFICATION: A 72 y.o. female with  large mediastinal mass and left sided effusion causing shortness of breath    She is here at the kind request of Dr. Jose Tucker    HISTORY OF PRESENT ILLNESS:   Patient is a pleasant 72-year-old female who presented to the ER with worsening breathing that started over the last few months along with associated 40 pound weight loss and 70-pack-year smoking history.  She underwent CT chest which showed a large left hilar mass encasing pulmonary arteries and aorta with rightward mass effect on trachea and narrowing of proximal bronchi and left lower lobe lingula segmental as well.  Patient had staging CT abdomen pelvis which showed no evidence of distant metastatic disease of note she does have a large left-sided pleural effusion and underwent thoracentesis and preliminarily shows no evidence of malignant cells.    PAST MEDICAL HISTORY:  Tob abuse  dyslipidemia    PAST SURGICAL HISTORY:  Past Surgical History:   Procedure Laterality Date    TONSILLECTOMY         CURRENT MEDICATIONS:  Current Facility-Administered Medications   Medication Dose Route Frequency Provider Last Rate Last Admin    potassium chloride SA (Kdur) tablet 40 mEq  40 mEq Oral DAILY Neo Song M.D.   40 mEq at 02/22/23 1028    magnesium oxide tablet 400 mg  400 mg Oral DAILY Neo Song M.D.   400 mg at 02/22/23 1028    ipratropium-albuterol (DUONEB) nebulizer solution  3 mL Nebulization Q2HRS PRN (RT) Jose Tucker M.D.        atorvastatin (LIPITOR) tablet 10 mg  10 mg Oral Nightly Neo Song M.D.   10 mg at 02/21/23 2132    lisinopril (PRINIVIL) tablet 5 mg  5 mg Oral QAM Neo Song M.D.   5 mg at 02/22/23 0512    omeprazole (PRILOSEC) capsule 20 mg  20 mg Oral BID Neo Song M.D.   20 mg at 02/22/23 0512    senna-docusate (PERICOLACE or SENOKOT S) 8.6-50 MG per tablet 2 Tablet  2 Tablet Oral BID PRN Neo Song M.D.   "      And    polyethylene glycol/lytes (MIRALAX) PACKET 1 Packet  1 Packet Oral QDAY PRN Neo Song M.D.        And    magnesium hydroxide (MILK OF MAGNESIA) suspension 30 mL  30 mL Oral QDAY PRN Neo Song M.D.        And    bisacodyl (DULCOLAX) suppository 10 mg  10 mg Rectal QDAY PRN Neo Song M.D.        0.9 % NaCl with KCl 40 mEq 1,000 mL 1,000 mL  1,000 mL Intravenous Continuous Neo Song M.D.   Stopped at 02/21/23 2328    ondansetron (ZOFRAN) syringe/vial injection 4 mg  4 mg Intravenous Q4HRS PRN Jose Tucker M.D.        nicotine (NICODERM) 14 MG/24HR 14 mg  14 mg Transdermal Daily-0600 Jose Tucker M.D.   14 mg at 02/22/23 0511    And    nicotine polacrilex (NICORETTE) 2 MG piece 2 mg  2 mg Oral Q HOUR PRN Jose Tucker M.D.        Respiratory Therapy Consult   Nebulization Continuous RT Jose Tucker M.D.           ALLERGIES:    Patient has no known allergies.    FAMILY HISTORY:    family history includes Cancer in her father and maternal aunt.  Lung cancer in father    SOCIAL HISTORY:     reports that she has been smoking cigarettes. She has a 70.00 pack-year smoking history. She has never used smokeless tobacco. She reports current alcohol use.    REVIEW OF SYSTEMS:    Comprehensive review of systems is negative with the exception of the aforementioned HPI, PMH, and PSH bullets in accordance with CMS guidelines.    PHYSICAL EXAM:    ECOG PERFORMANCE STATUS: 1    BP (!) 155/95 Comment: RN notified   Pulse (!) 104 Comment: RN notified   Temp 36.7 °C (98 °F) (Temporal)   Resp 19   Ht 1.676 m (5' 6\")   Wt 57.2 kg (126 lb)   SpO2 99%   BMI 20.34 kg/m²   Physical Exam  Vitals reviewed.   HENT:      Head: Normocephalic.   Eyes:      Pupils: Pupils are equal, round, and reactive to light.   Cardiovascular:      Rate and Rhythm: Normal rate.   Pulmonary:      Effort: Pulmonary effort is normal.   Abdominal:      General: Abdomen is flat.   Musculoskeletal:        "  General: Normal range of motion.   Neurological:      General: No focal deficit present.      Mental Status: She is alert.   Psychiatric:         Mood and Affect: Mood normal.        LABORATORY DATA:   Lab Results   Component Value Date/Time    WBC 8.9 02/21/2023 0043    WBC 11.3 (H) 02/20/2023 1321    HEMOGLOBIN 14.1 02/21/2023 0043    HEMOGLOBIN 15.0 02/20/2023 1321    HEMATOCRIT 43.0 02/21/2023 0043    HEMATOCRIT 46.6 02/20/2023 1321    MCV 87.4 02/21/2023 0043    MCV 87.6 02/20/2023 1321    PLATELETCT 416 02/21/2023 0043    PLATELETCT 468 (H) 02/20/2023 1321    NEUTS 8.55 (H) 02/20/2023 1321      Lab Results   Component Value Date/Time    SODIUM 142 02/21/2023 0043    SODIUM 140 02/20/2023 1321    POTASSIUM 3.9 02/22/2023 0023    POTASSIUM 3.0 (L) 02/21/2023 0043    POTASSIUM 3.3 (L) 02/20/2023 1321    BUN 9 02/21/2023 0043    BUN 12 02/20/2023 1321    CREATININE 0.41 (L) 02/21/2023 0043    CREATININE 0.41 (L) 02/20/2023 1321    CREATININE 0.45 (L) 02/16/2023 1737    CALCIUM 9.5 02/21/2023 0043    CALCIUM 9.9 02/20/2023 1321    ALBUMIN 3.7 02/20/2023 1321    ASTSGOT 20 02/20/2023 1321    ALKPHOSPHAT 103 (H) 02/20/2023 1321    LDHTOTAL 480 (H) 02/21/2023 1345       RADIOLOGY DATA:  CT-ABDOMEN-PELVIS WITH    Result Date: 2/21/2023 2/21/2023 3:31 PM HISTORY/REASON FOR EXAM:  Small cell lung cancer, staging. TECHNIQUE/EXAM DESCRIPTION:   CT scan of the abdomen and pelvis with contrast. Contrast-enhanced helical scanning was obtained from the diaphragmatic domes through the pubic symphysis following the bolus administration of nonionic contrast without complication. 80 mL of Omnipaque 350 nonionic contrast was administered without complication. Low dose optimization technique was utilized for this CT exam including automated exposure control and adjustment of the mA and/or kV according to patient size. COMPARISON: CT chest yesterday FINDINGS: Lower Chest: Small layering left pleural effusion. Some atelectasis  incompletely visualized in the lingula. Breathing motion artifact. Mild left hemidiaphragm elevation Liver: Upper normal size. Spleen: Unremarkable. Pancreas: Normal.  No duct dilation or adjacent fat stranding. . Gallbladder: Small layering low-density stones. Biliary: Nondilated. Adrenal glands: Normal. Kidneys: Unremarkable.  No hydronephrosis. Bowel: No obstruction or acute inflammation. Moderate distal colonic diverticulosis. Lymph nodes: No adenopathy. Vasculature: Moderate atherosclerosis with mild infrarenal aortic ectasia, 20 mm. Peritoneum: No ascites or mass. . Pelvis: No adenopathy or free fluid. Contrast distends the bladder without diverticulum seen. No wall thickening noted. Normal size uterus. No cystic adnexal mass     1.  No CT evidence of abdominopelvic metastatic disease 2.  Borderline hepatomegaly 3.  Cholelithiasis 4.  Colonic diverticulosis without evidence of diverticulitis 5.  Left hemidiaphragm elevation with small left effusion    CT-CHEST (THORAX) WITH    Result Date: 2/20/2023 2/20/2023 3:23 PM HISTORY/REASON FOR EXAM:  Increasing shortness of breath. Cough.. TECHNIQUE/EXAM DESCRIPTION: CT scan of the chest with contrast. Thin-section helical images were obtained from the lung apices through the adrenal glands following the bolus administration of contrast. 0 mL of Omnipaque 350 nonionic contrast was utilized. Low dose optimization technique was utilized for this CT exam including automated exposure control and adjustment of the mA and/or kV according to patient size. COMPARISON:  None. FINDINGS: Lungs: There are lucencies in the upper lobes consistent with centrilobular and paraseptal emphysema. There is an ill-defined airspace opacity in the anterior medial left upper lobe. There is left lower lobe dependent atelectasis and groundglass opacity.  There is right lower lobe dependent atelectasis. There is linear opacity in the lingula with air bronchograms. Mediastinum/Melissa: There there  is extensive bulky diffuse lymphadenopathy involving the entire mediastinum which encases the pulmonary arteries more so on the left. There is narrowing of the left greater than right mainstem bronchi and proximal segmental bronchi on the left. There is deviation of the trachea and other mediastinal structures to the right. The mass also encases the aortic arch and descending thoracic aortae. Individual nodes cannot be measured. The entire area measured at a level just above the elena measures 13.7 x 13.1 cm. Pleura: There is a small to moderate dependent left pleural effusion. Cardiac: Heart normal in size without pericardial effusion. Vascular: There is atherosclerosis. Vascular encasement as noted above. Pulmonary arteries are patent. Soft tissues: Unremarkable. Bones: No acute or destructive process. Upper abdomen:  No adenopathy.     1.  There is a large mediastinal mass extending into the left hilar region encasing pulmonary arteries and portions of the thoracic aorta with rightward mass effect on the trachea and narrowing of the proximal bronchi and left lower lobe and lingular segmental bronchi. Findings are consistent with malignancy with lymphoma considered most likely etiology. 2.  There is a moderate-sized left pleural effusion. 3.  Anteromedial left upper lobe airspace process most consistent postobstructive pneumonitis. 4.  Left lower lobe and lingular opacities could represent pneumonitis or atelectasis. 5.  There is underlying emphysema. Fleischner Society pulmonary nodule recommendations: Not Applicable     CT-HEAD WITH & W/O    Result Date: 2/21/2023 2/21/2023 3:31 PM HISTORY/REASON FOR EXAM:  Small cell lung cancer, staging. TECHNIQUE/EXAM DESCRIPTION AND NUMBER OF VIEWS: CT scan of the head without and with contrast. The study was performed on a helical multidetector CT scanner. Contiguous axial sections were obtained from the skull base through the vertex both prior to and after the  administration of IV contrast. 80 mL of Omnipaque 350 nonionic contrast was injected intravenously. Up to date radiation dose reduction adjustments have been utilized to meet ALARA standards for radiation dose reduction. COMPARISON:  None available FINDINGS: The patient had a difficult time holding still for the skin The calvariae and skull base are unremarkable. There are no extraaxial fluid collections. The ventricular system and basal cisterns are within normal limits. There are moderate patchy areas of white matter hypodensity seen best in the frontal lobes and these areas do not abnormally enhance There are no hemorrhagic lesions. There are no mass effects or shift of midline structures. There are no areas of abnormal parenchymal or meningeal enhancement. The brainstem and posterior fossa structures are unremarkable. The paranasal sinuses and mastoids in the field of view are unremarkable.     No evidence of acute cerebral infarction, hemorrhage, mass lesion, or abnormal enhancement. Moderate white matter hypodensity is present.  This is a nonspecific finding which usually is found to represent chronic microvascular disease in patient's of this demographic.  Demyelination, age indeterminant ischemia and gliosis are also common possibilities. Age-appropriate atrophy    CT-SOFT TISSUE NECK WITH    Result Date: 2/20/2023 2/20/2023 3:23 PM HISTORY/REASON FOR EXAM:  Paralyzed vocal cord. Cough. TECHNIQUE/EXAM DESCRIPTION AND NUMBER OF VIEWS:  CT soft tissue neck with contrast. The study was performed on a helical multidetector CT scanner. Contiguous thin section helical images were obtained of the neck from the skull base through the thoracic inlet. 90 mL of Omnipaque 350 nonionic contrast was injected intravenously. Low dose optimization technique was utilized for this CT exam including automated exposure control and adjustment of the mA and/or kV according to patient size. COMPARISON: CT chest same time FINDINGS:  BRAIN: Visualized portions of the brain are normal in appearance. TEMPORAL bones: The mastoid air cells are clear. PARANASAL SINUSES: The paranasal sinuses are clear. NASOPHARYNX:  The nasopharyngeal tissues are symmetric without mass.  Normal fat in the parapharyngeal spaces. OROPHARYNX AND HYPOPHARYNX:The pharyngeal tissues are symmetric without masses. NODES: There is no cervial lymphadenopathy. SALIVARY glands: The parotid and submandibular glands are normal in appearance. THYROID:  The thyroid gland is unremarkable. AIRWAY: The airway appears patent. VASCULAR:  The visualized vascular structures are patent. MEDIASTINUM: Visualized upper mediastinum demonstrates the extensive large mediastinal mass as described on prior chest CT. Dependent left pleural effusion as described on chest CT. BONES: Unremarkable for age.     1.  There is no cervical lymphadenopathy. 2.  Airway is patent. 3.  No evidence of acute inflammation. 4.  Upper intrathoracic findings as discussed on prior chest CT.    DX-CHEST-PORTABLE (1 VIEW)    Result Date: 2/21/2023 2/21/2023 10:02 AM HISTORY/REASON FOR EXAM:  Pleural Effusion; post lt thora. TECHNIQUE/EXAM DESCRIPTION AND NUMBER OF VIEWS: Single portable view of the chest. COMPARISON: Chest x-ray 2/20/2023 FINDINGS: Heart size is within normal limits. There is ectasia of the thoracic aorta. There is prominence of the left pulmonary hilum unchanged from previous exam No pulmonary infiltrates or consolidations are noted. There is mild elevation of left hemidiaphragm. There is blunting of left costophrenic angle.     1.  Small left pleural effusion with elevation of the left hemidiaphragm. No evidence of left-sided pneumothorax status post thoracentesis. 2.  Ectasia of the thoracic aorta. 3.  Prominence of the left pulmonary hilum which may be secondary to adenopathy/mass.    DX-CHEST-PORTABLE (1 VIEW)    Result Date: 2/20/2023 2/20/2023 1:31 PM HISTORY/REASON FOR EXAM:  Shortness of  Breath TECHNIQUE/EXAM DESCRIPTION AND NUMBER OF VIEWS: Single portable view of the chest. COMPARISON: None FINDINGS: Elevation of the left hemidiaphragm. Patchy left basilar opacity. Small left pleural effusion. No pneumothorax. Stable cardiopericardial silhouette.     Elevation of the diaphragm with left basilar atelectasis. Superimposed infection is possible. Small left pleural effusion.    US-THORACENTESIS PUNCTURE LEFT    Result Date: 2/21/2023 2/21/2023 9:17 AM HISTORY/REASON FOR EXAM:  Shortness of breath; Pt reports to having a paralyzed vocal cord and is scheduled tomorrow to get a CT scan done however they are concerned that the patient is losing a great deal of weight, she feels more short of breath, pts daughter also reports that she has a cough but is unable to cough out sputum due to her condition. TECHNIQUE/EXAM DESCRIPTION: Ultrasound-guided thoracentesis. Indication:  LEFT pleural fluid collection. COMPARISON:  CT chest 2/20/2023 PROCEDURE:     Informed consent was obtained. A timeout was taken. A left pleural effusion was localized with real-time ultrasound guidance. The left posterior chest wall was prepped and draped in a sterile manner. Following local anesthesia with 1% lidocaine, a 5 Syriac Insuritaseh pigtail catheter was advanced into the pleural space with trocar technique and pleural fluid was drained. The patient tolerated the procedure well without evidence of complication. A post thoracentesis chest radiograph is forthcoming. FINDINGS: Fluid was sent to the laboratory. Fluid character: serosanguinous     1. Ultrasound guided left sided diagnostic and therapeutic thoracentesis. 2. 900 mL of fluid withdrawn.    IMPRESSION:    72 y.o. female with  large mediastinal mass and left sided effusion causing shortness of breath    RECOMMENDATIONS:   I discussed that we will follow-up on final pathology from EBUS tomorrow but if lung cancer would recommend radiation urgently potentially concurrently with  chemotherapy if stage III and localized to just the chest.  We will follow-up with patient next Monday once final pathology has resulted.      Thank you for the opportunity to participate in her care.  If any questions or comments, please do not hesitate in calling.    CC: Dr. Gutierrez

## 2023-02-22 NOTE — DISCHARGE SUMMARY
Discharge Summary    CHIEF COMPLAINT ON ADMISSION  Chief Complaint   Patient presents with    Shortness of Breath     Pt reports to having a paralyzed vocal cord and is scheduled tomorrow to get a CT scan done however they are concerned that the patient is losing a great deal of weight, she feels more short of breath, pts daughter also reports that she has a cough but is unable to cough out sputum due to her condition.        Reason for Admission  Hilar / Mediastinal Mass    Admission Date  2/20/2023    CODE STATUS  DNAR/DNI    HPI & HOSPITAL COURSE  This is a 72 y.o. female with tobacco use DO and vocal cord paralysis here with dysphagia, weight loss, and dyspnea.    CXR demonstrated Left basilar atelectasis and Left pleural effusion. Subsequent CT Chest demonstrated a large mediastinal mass communicating with the Left hilum encasing the pulmonary arteries and creating Rightward tracheoesophageal mass effect. CT neck was without obstruction nor lymphadenopathy. She was admitted for expedited workup due to concern for mediastinal compression. IR was consulted and recommend bronchoscopic biopsy due to risk of tissue damage with CT-guided biopsy. Pulmonology was consulted and coordinated EBUS for Thursday 2/23/23 at [facility]. Oncology was consulted and recommended CT A/P to complete staging, which was negative for sub-diaphragmatic disease. CT head was also negative for apparent metastasis. She was initiated on IV antibiotics empirically for pneumonia, which were discontinued when procalcitonin returned negative.    SLP was consulted for her dysphagia, dysphonia, and apparent weight loss. She underwent FEES which demonstrated lower esophageal regurgitation due to distortion of the mediastinum. She was able to eat without oropharyngeal dysphagia but was at high risk of aspiration of gastric contents for which SLP recommended strict NPO and consideration of enteral tube placement. During a patient-centered  discussion she was advised that a PEG would provide a reliable semi-permanent access for administration of medications and TF, but would not reduce her risk of esophageal aspiration. From her experience with home health care she did not want a PEG placed and opted instead to eat despite risk for quality of life. She was counseled on aspiration precautions and advised to eat an unrestricted diet to maintain caloric intake due to apparent weight loss. She was initiated on PPI to reduce risk of chemical pneumonitis for likely aspiration events in the future.    She underwent thoracentesis of the Left pleural fluid with ~900 cc. It was exudative by Light's criteria and likely malignant. Cytology is pending at time of discharge but due to poor sensitivity would not exclude malignant effusion if negative.    Therefore, she is discharged in guarded and stable condition to home with close outpatient follow-up.    The patient met 2-midnight criteria for an inpatient stay at the time of discharge.    Discharge Date  2/22/2023    FOLLOW UP ITEMS POST DISCHARGE    Hilar/Mediastinal Mass - EBUS with pulmonology at [facility] 2/23/23, follow up with [facility] oncologist Dr. Gutierrez after results available for antineoplastic therapy consideration    Pleural effusion - likely malignant, follow up on cytology. If recurrent will warrant outpatient thoracentesis.    Tobacco use DO - prescribed NRT patches and lozenges, follow up with PCP For further cessation assistance if needed    DISCHARGE DIAGNOSES  Principal Problem:    Hilar mass POA: Yes  Active Problems:    Esophageal dysphagia POA: Yes    Tobacco abuse POA: Yes    Pleural effusion on left POA: Yes    Acute hypoxemic respiratory failure (HCC) POA: Yes  Resolved Problems:    Leukocytosis POA: Yes    Dehydration POA: Yes    Hypomagnesemia POA: Yes    Hypokalemia POA: Yes      FOLLOW UP  No future appointments.  Shabana Lagunas, A.P.R.N.  [address]  [address]  [facility]  [address]  [phone]    Schedule an appointment as soon as possible for a visit in 3 week(s)  after-hospital follow up    Joyce Gutierrez M.D.  [address]  [place] [address]  [phone]    Schedule an appointment as soon as possible for a visit in 2 week(s)  after-hospital cancer follow up    [facility]  [address]  [place] [address]  [phone]  Go in 1 day(s)  endoscopic biopsy of hilar mass - 11 am  PLEASE ARRIVE AT LEAST 1 HOUR PRIOR TO YOUR PROCEDURE      MEDICATIONS ON DISCHARGE     Medication List        START taking these medications        Instructions   magnesium oxide 400 (240 Mg) MG Tabs   Take 1 Tablet by mouth every day.  Dose: 400 mg     * nicotine 21 MG/24HR Pt24  Commonly known as: NICODERM   Place 1 Patch on the skin every 24 hours for 14 days.  Dose: 1 Patch     * nicotine 14 MG/24HR Pt24  Start taking on: March 9, 2023  Commonly known as: NICODERM   Place 1 Patch on the skin every 24 hours for 14 days.  Dose: 1 Patch     * nicotine 7 MG/24HR Pt24  Start taking on: March 24, 2023  Commonly known as: NICODERM   Place 1 Patch on the skin every 24 hours for 14 days.  Dose: 1 Patch     nicotine polacrilex 2 MG Gum  Commonly known as: NICORETTE   Take 1 Each by mouth every 1 hour as needed for Smoking Cessation (For nicotine urge).  Dose: 2 mg     omeprazole 20 MG delayed-release capsule  Commonly known as: PRILOSEC   Take 1 Capsule by mouth 2 times a day.  Dose: 20 mg     potassium chloride SA 20 MEQ Tbcr  Commonly known as: Kdur   Take 2 Tablets by mouth every day.  Dose: 40 mEq           * This list has 3 medication(s) that are the same as other medications prescribed for you. Read the directions carefully, and ask your doctor or other care provider to review them with you.                CONTINUE taking these medications        Instructions   atorvastatin 10 MG Tabs  Commonly known as: LIPITOR   Take 10 mg by mouth every evening.  Dose: 10 mg     lisinopril  5 MG Tabs  Commonly known as: PRINIVIL   Take 5 mg by mouth every morning.  Dose: 5 mg     metFORMIN 500 MG Tabs  Commonly known as: GLUCOPHAGE   Take 500 mg by mouth 2 times a day with meals.  Dose: 500 mg              Allergies  No Known Allergies    DIET  Orders Placed This Encounter   Procedures    Diet Order Diet: Regular     Standing Status:   Standing     Number of Occurrences:   1     Order Specific Question:   Diet:     Answer:   Regular [1]       ACTIVITY  As tolerated.  Weight bearing as tolerated    CONSULTATIONS  Pulmonology  Oncology  Radiation Oncology    PROCEDURES  None    LABORATORY  Lab Results   Component Value Date    SODIUM 142 02/21/2023    POTASSIUM 3.9 02/22/2023    CHLORIDE 100 02/21/2023    CO2 23 02/21/2023    GLUCOSE 121 (H) 02/21/2023    BUN 9 02/21/2023    CREATININE 0.41 (L) 02/21/2023        Lab Results   Component Value Date    WBC 8.9 02/21/2023    HEMOGLOBIN 14.1 02/21/2023    HEMATOCRIT 43.0 02/21/2023    PLATELETCT 416 02/21/2023        Total time of the discharge process exceeds 35 minutes.

## 2023-02-22 NOTE — FACE TO FACE
Face to Face Note  -  Durable Medical Equipment    Neo Song M.D. - NPI: [id number]  I certify that this patient is under my care and that they had a durable medical equipment(DME)face to face encounter by myself that meets the physician DME face-to-face encounter requirements with this patient on:    Date of encounter:   Patient:                    MRN:                       YOB: 2022  Shantal Quach  [id number]  1950     The encounter with the patient was in whole, or in part, for the following medical condition, which is the primary reason for durable medical equipment:  Other - Stage IV Lung Cancer , Severe Protein-Calorie Malnutrition, Fatigue    I certify that, based on my findings, the following durable medical equipment is medically necessary:    Walkers.    My Clinical findings support the need for the above equipment due to:  Abnormal Gait

## 2023-02-22 NOTE — FACE TO FACE
"Face to Face Note  -  Durable Medical Equipment    Neo Song M.D. - NPI: [id number]  I certify that this patient is under my care and that they had a durable medical equipment(DME)face to face encounter by myself that meets the physician DME face-to-face encounter requirements with this patient on:    Date of encounter:   Patient:                    MRN:                       YOB: 2022  Shantal Quach  [id number]  1950     The encounter with the patient was in whole, or in part, for the following medical condition, which is the primary reason for durable medical equipment:  Other - Lung cancer with malignant effusion    I certify that, based on my findings, the following durable medical equipment is medically necessary:    Oxygen   HOME O2 Saturation Measurements:(Values must be present for Home Oxygen orders)  Room air sat at rest: 88  Room air sat with amb: 91  With liters of O2: 2, O2 sat at rest with O2: 98  With Liters of O2: 2, O2 sat with amb with O2 : 99     If patient feels more short of breath, they can go up to 6 liters per minute and contact healthcare provider.    Supporting Symptoms: The patient requires supplemental oxygen, as the following interventions have been tried with limited or no improvement: \"Bronchodilators and/or steroid inhalers, \"Ambulation with oximetry, and \"Incentive spirometry.    My Clinical findings support the need for the above equipment due to:  Hypoxia  "

## 2023-02-23 ENCOUNTER — HOSPITAL ENCOUNTER (OUTPATIENT)
Facility: MEDICAL CENTER | Age: 73
End: 2023-02-24
Attending: INTERNAL MEDICINE | Admitting: INTERNAL MEDICINE
Payer: MEDICARE

## 2023-02-23 ENCOUNTER — HOSPITAL ENCOUNTER (OUTPATIENT)
Facility: MEDICAL CENTER | Age: 73
End: 2023-02-23
Attending: INTERNAL MEDICINE | Admitting: INTERNAL MEDICINE
Payer: MEDICARE

## 2023-02-23 DIAGNOSIS — R91.8 LUNG MASS: ICD-10-CM

## 2023-02-23 DIAGNOSIS — J98.59 MEDIASTINAL MASS: ICD-10-CM

## 2023-02-23 NOTE — OR NURSING
Pt and daughter arrived for check in at [facility]. Pt/daughter were updated re need for cancellation/anesthesia decision-see note. Dr. Diaz spoke to daughter over the phone as well. Pt was taken out by RN to private vehicle.

## 2023-02-23 NOTE — CONSULTS
I was scheduled to anesthetize this pt. for an EBUS procedure at [facility] in the procedure room.  This patient's dyspneic symptoms are what brought her to the E.D.  On reviewing the CT scan, there is a massive mediastinal mass measuring 13.7 cm. x 13.1 cm just superior to the elena.  Her trachea and other mediastinal structures are significantly deviated to the right.  The proximal tracheobronchial tree is compressed.  Furthermore the mass envelops the thoracic aorta and pulmonary arteries.  On induction of anesthesia, catastrophic cardiovascular collapse and loss of airway could occur.  This case would be done the most safely at [facility], in a CT OR with the CT surgeon aware.  Invasive BP monitoring should be used  and the patient should have her blood  typed in case of emergent need of transfusion.      No other further testing such as an ECHO is warranted since the patient's cardiac function is most likely normal as she has no cardiac or other medical history.  Her cardiac function might be normal while she's awake and breathing spontaneously, but because of the location and size of the mass, as mentioned above, upon induction of anesthesia, this could lead to cardiovascular and airway collapse.  Hopefully a way can be found to do this procedure safely so a diagnosis can be obtained.

## 2023-02-24 ENCOUNTER — APPOINTMENT (OUTPATIENT)
Dept: RADIOLOGY | Facility: MEDICAL CENTER | Age: 73
DRG: 180 | End: 2023-02-24
Attending: EMERGENCY MEDICINE
Payer: MEDICARE

## 2023-02-24 ENCOUNTER — HOSPITAL ENCOUNTER (INPATIENT)
Facility: MEDICAL CENTER | Age: 73
LOS: 2 days | DRG: 180 | End: 2023-02-26
Attending: EMERGENCY MEDICINE | Admitting: INTERNAL MEDICINE
Payer: MEDICARE

## 2023-02-24 DIAGNOSIS — J98.59 MEDIASTINAL MASS: ICD-10-CM

## 2023-02-24 DIAGNOSIS — R06.03 RESPIRATORY DISTRESS: ICD-10-CM

## 2023-02-24 PROBLEM — R13.12 OROPHARYNGEAL DYSPHAGIA: Status: ACTIVE | Noted: 2023-02-24

## 2023-02-24 PROBLEM — I48.91 ATRIAL FIBRILLATION WITH RAPID VENTRICULAR RESPONSE (HCC): Status: ACTIVE | Noted: 2023-02-24

## 2023-02-24 PROBLEM — E11.9 TYPE 2 DIABETES MELLITUS WITHOUT COMPLICATION, WITH LONG-TERM CURRENT USE OF INSULIN (HCC): Status: ACTIVE | Noted: 2023-02-24

## 2023-02-24 PROBLEM — J96.20 ACUTE AND CHRONIC RESPIRATORY FAILURE (ACUTE-ON-CHRONIC) (HCC): Status: ACTIVE | Noted: 2023-02-24

## 2023-02-24 PROBLEM — E87.6 HYPOKALEMIA: Status: ACTIVE | Noted: 2023-02-24

## 2023-02-24 PROBLEM — Z79.4 TYPE 2 DIABETES MELLITUS WITHOUT COMPLICATION, WITH LONG-TERM CURRENT USE OF INSULIN (HCC): Status: ACTIVE | Noted: 2023-02-24

## 2023-02-24 LAB
ALBUMIN SERPL BCP-MCNC: 4.1 G/DL (ref 3.2–4.9)
ALBUMIN/GLOB SERPL: 1.1 G/DL
ALP SERPL-CCNC: 103 U/L
ALT SERPL-CCNC: 14 U/L (ref 2–50)
ANION GAP SERPL CALC-SCNC: 11 MMOL/L (ref 7–16)
ANION GAP SERPL CALC-SCNC: 11 MMOL/L (ref 7–16)
AST SERPL-CCNC: 23 U/L (ref 12–45)
BACTERIA FLD AEROBE CULT: NORMAL
BASOPHILS # BLD AUTO: 0.4 % (ref 0–1.8)
BASOPHILS # BLD: 0.06 K/UL (ref 0–0.12)
BILIRUB SERPL-MCNC: 0.3 MG/DL (ref 0.1–1.5)
BUN SERPL-MCNC: 7 MG/DL (ref 8–22)
BUN SERPL-MCNC: 8 MG/DL (ref 8–22)
CALCIUM ALBUM COR SERPL-MCNC: 9.7 MG/DL (ref 8.5–10.5)
CALCIUM SERPL-MCNC: 9.2 MG/DL (ref 8.5–10.5)
CALCIUM SERPL-MCNC: 9.8 MG/DL (ref 8.5–10.5)
CHLORIDE SERPL-SCNC: 98 MMOL/L (ref 96–112)
CHLORIDE SERPL-SCNC: 99 MMOL/L (ref 96–112)
CO2 SERPL-SCNC: 31 MMOL/L (ref 20–33)
CO2 SERPL-SCNC: 34 MMOL/L (ref 20–33)
CREAT SERPL-MCNC: 0.29 MG/DL (ref 0.5–1.4)
CREAT SERPL-MCNC: 0.37 MG/DL (ref 0.5–1.4)
EKG IMPRESSION: NORMAL
EKG IMPRESSION: NORMAL
EOSINOPHIL # BLD AUTO: 0.01 K/UL (ref 0–0.51)
EOSINOPHIL NFR BLD: 0.1 % (ref 0–6.9)
ERYTHROCYTE [DISTWIDTH] IN BLOOD BY AUTOMATED COUNT: 49.2 FL (ref 35.9–50)
GFR SERPLBLD CREATININE-BSD FMLA CKD-EPI: 119 ML/MIN/1.73 M 2
GFR SERPLBLD CREATININE-BSD FMLA CKD-EPI: 128 ML/MIN/1.73 M 2
GLOBULIN SER CALC-MCNC: 3.8 G/DL (ref 1.9–3.5)
GLUCOSE BLD STRIP.AUTO-MCNC: 172 MG/DL (ref 65–99)
GLUCOSE BLD STRIP.AUTO-MCNC: 173 MG/DL (ref 65–99)
GLUCOSE SERPL-MCNC: 172 MG/DL (ref 65–99)
GLUCOSE SERPL-MCNC: 188 MG/DL (ref 65–99)
GRAM STN SPEC: NORMAL
HCT VFR BLD AUTO: 46.4 % (ref 42–52)
HGB BLD-MCNC: 15.1 G/DL (ref 14–18)
IMM GRANULOCYTES # BLD AUTO: 0.09 K/UL (ref 0–0.11)
IMM GRANULOCYTES NFR BLD AUTO: 0.5 % (ref 0–0.9)
LACTATE SERPL-SCNC: 1.8 MMOL/L (ref 0.5–2)
LACTATE SERPL-SCNC: 2 MMOL/L (ref 0.5–2)
LACTATE SERPL-SCNC: 2.4 MMOL/L (ref 0.5–2)
LYMPHOCYTES # BLD AUTO: 1.18 K/UL (ref 1–4.8)
LYMPHOCYTES NFR BLD: 7.1 % (ref 22–41)
MAGNESIUM SERPL-MCNC: 1.5 MG/DL (ref 1.5–2.5)
MCH RBC QN AUTO: 28.2 PG (ref 27–33)
MCHC RBC AUTO-ENTMCNC: 32.5 G/DL (ref 33.6–35)
MCV RBC AUTO: 86.6 FL (ref 81.4–97.8)
MONOCYTES # BLD AUTO: 1.34 K/UL (ref 0–0.85)
MONOCYTES NFR BLD AUTO: 8.1 % (ref 0–13.4)
NEUTROPHILS # BLD AUTO: 13.91 K/UL (ref 1.82–7.42)
NEUTROPHILS NFR BLD: 83.8 % (ref 44–72)
NRBC # BLD AUTO: 0 K/UL
NRBC BLD-RTO: 0 /100 WBC
NT-PROBNP SERPL IA-MCNC: 466 PG/ML (ref 0–125)
PLATELET # BLD AUTO: 492 K/UL (ref 164–446)
PMV BLD AUTO: 9.4 FL (ref 9–12.9)
POTASSIUM SERPL-SCNC: 3.2 MMOL/L (ref 3.6–5.5)
POTASSIUM SERPL-SCNC: 4.6 MMOL/L (ref 3.6–5.5)
PROT SERPL-MCNC: 7.9 G/DL (ref 6–8.2)
RBC # BLD AUTO: 5.36 M/UL (ref 4.7–6.1)
SIGNIFICANT IND 70042: NORMAL
SITE SITE: NORMAL
SODIUM SERPL-SCNC: 141 MMOL/L (ref 135–145)
SODIUM SERPL-SCNC: 143 MMOL/L (ref 135–145)
SOURCE SOURCE: NORMAL
TROPONIN T SERPL-MCNC: 15 NG/L (ref 6–19)
WBC # BLD AUTO: 16.6 K/UL (ref 4.8–10.8)

## 2023-02-24 PROCEDURE — 84484 ASSAY OF TROPONIN QUANT: CPT

## 2023-02-24 PROCEDURE — 96375 TX/PRO/DX INJ NEW DRUG ADDON: CPT

## 2023-02-24 PROCEDURE — 87040 BLOOD CULTURE FOR BACTERIA: CPT | Mod: 91

## 2023-02-24 PROCEDURE — 96376 TX/PRO/DX INJ SAME DRUG ADON: CPT

## 2023-02-24 PROCEDURE — 96374 THER/PROPH/DIAG INJ IV PUSH: CPT

## 2023-02-24 PROCEDURE — 80053 COMPREHEN METABOLIC PANEL: CPT

## 2023-02-24 PROCEDURE — 99291 CRITICAL CARE FIRST HOUR: CPT | Performed by: INTERNAL MEDICINE

## 2023-02-24 PROCEDURE — 71045 X-RAY EXAM CHEST 1 VIEW: CPT

## 2023-02-24 PROCEDURE — 83605 ASSAY OF LACTIC ACID: CPT

## 2023-02-24 PROCEDURE — 700102 HCHG RX REV CODE 250 W/ 637 OVERRIDE(OP): Performed by: INTERNAL MEDICINE

## 2023-02-24 PROCEDURE — 94640 AIRWAY INHALATION TREATMENT: CPT

## 2023-02-24 PROCEDURE — 700105 HCHG RX REV CODE 258: Performed by: EMERGENCY MEDICINE

## 2023-02-24 PROCEDURE — 93005 ELECTROCARDIOGRAM TRACING: CPT

## 2023-02-24 PROCEDURE — 700101 HCHG RX REV CODE 250: Performed by: EMERGENCY MEDICINE

## 2023-02-24 PROCEDURE — 94660 CPAP INITIATION&MGMT: CPT

## 2023-02-24 PROCEDURE — 80048 BASIC METABOLIC PNL TOTAL CA: CPT

## 2023-02-24 PROCEDURE — 770022 HCHG ROOM/CARE - ICU (200)

## 2023-02-24 PROCEDURE — 700111 HCHG RX REV CODE 636 W/ 250 OVERRIDE (IP): Performed by: INTERNAL MEDICINE

## 2023-02-24 PROCEDURE — 82962 GLUCOSE BLOOD TEST: CPT

## 2023-02-24 PROCEDURE — A9270 NON-COVERED ITEM OR SERVICE: HCPCS | Performed by: INTERNAL MEDICINE

## 2023-02-24 PROCEDURE — 83880 ASSAY OF NATRIURETIC PEPTIDE: CPT

## 2023-02-24 PROCEDURE — 94760 N-INVAS EAR/PLS OXIMETRY 1: CPT

## 2023-02-24 PROCEDURE — 36415 COLL VENOUS BLD VENIPUNCTURE: CPT

## 2023-02-24 PROCEDURE — 83735 ASSAY OF MAGNESIUM: CPT

## 2023-02-24 PROCEDURE — 93005 ELECTROCARDIOGRAM TRACING: CPT | Performed by: EMERGENCY MEDICINE

## 2023-02-24 PROCEDURE — 85025 COMPLETE CBC W/AUTO DIFF WBC: CPT

## 2023-02-24 PROCEDURE — 700111 HCHG RX REV CODE 636 W/ 250 OVERRIDE (IP): Performed by: EMERGENCY MEDICINE

## 2023-02-24 PROCEDURE — 700105 HCHG RX REV CODE 258: Performed by: INTERNAL MEDICINE

## 2023-02-24 PROCEDURE — 99291 CRITICAL CARE FIRST HOUR: CPT

## 2023-02-24 RX ORDER — POTASSIUM CHLORIDE 20 MEQ/1
40 TABLET, EXTENDED RELEASE ORAL DAILY
COMMUNITY

## 2023-02-24 RX ORDER — SODIUM CHLORIDE, SODIUM LACTATE, POTASSIUM CHLORIDE, AND CALCIUM CHLORIDE .6; .31; .03; .02 G/100ML; G/100ML; G/100ML; G/100ML
1000 INJECTION, SOLUTION INTRAVENOUS ONCE
Status: COMPLETED | OUTPATIENT
Start: 2023-02-24 | End: 2023-02-24

## 2023-02-24 RX ORDER — LISINOPRIL 5 MG/1
5 TABLET ORAL DAILY
COMMUNITY

## 2023-02-24 RX ORDER — POTASSIUM CHLORIDE 7.45 MG/ML
10 INJECTION INTRAVENOUS
Status: COMPLETED | OUTPATIENT
Start: 2023-02-24 | End: 2023-02-24

## 2023-02-24 RX ORDER — BISACODYL 10 MG
10 SUPPOSITORY, RECTAL RECTAL
Status: DISCONTINUED | OUTPATIENT
Start: 2023-02-24 | End: 2023-02-25

## 2023-02-24 RX ORDER — METHYLPREDNISOLONE SODIUM SUCCINATE 40 MG/ML
40 INJECTION, POWDER, LYOPHILIZED, FOR SOLUTION INTRAMUSCULAR; INTRAVENOUS ONCE
Status: COMPLETED | OUTPATIENT
Start: 2023-02-24 | End: 2023-02-24

## 2023-02-24 RX ORDER — AMOXICILLIN 250 MG
2 CAPSULE ORAL 2 TIMES DAILY
Status: DISCONTINUED | OUTPATIENT
Start: 2023-02-24 | End: 2023-02-25

## 2023-02-24 RX ORDER — SODIUM CHLORIDE 9 MG/ML
250 INJECTION, SOLUTION INTRAVENOUS ONCE
Status: COMPLETED | OUTPATIENT
Start: 2023-02-24 | End: 2023-02-24

## 2023-02-24 RX ORDER — OMEPRAZOLE 20 MG/1
20 CAPSULE, DELAYED RELEASE ORAL 2 TIMES DAILY
Status: DISCONTINUED | OUTPATIENT
Start: 2023-02-24 | End: 2023-02-25

## 2023-02-24 RX ORDER — LISINOPRIL 5 MG/1
5 TABLET ORAL DAILY
Status: DISCONTINUED | OUTPATIENT
Start: 2023-02-25 | End: 2023-02-25

## 2023-02-24 RX ORDER — ONDANSETRON 2 MG/ML
4 INJECTION INTRAMUSCULAR; INTRAVENOUS ONCE
Status: COMPLETED | OUTPATIENT
Start: 2023-02-24 | End: 2023-02-24

## 2023-02-24 RX ORDER — NICOTINE 21 MG/24HR
1 PATCH, TRANSDERMAL 24 HOURS TRANSDERMAL EVERY EVENING
Status: DISCONTINUED | OUTPATIENT
Start: 2023-02-24 | End: 2023-02-25

## 2023-02-24 RX ORDER — POLYETHYLENE GLYCOL 3350 17 G/17G
1 POWDER, FOR SOLUTION ORAL
Status: DISCONTINUED | OUTPATIENT
Start: 2023-02-24 | End: 2023-02-25

## 2023-02-24 RX ORDER — ATORVASTATIN CALCIUM 10 MG/1
10 TABLET, FILM COATED ORAL NIGHTLY
COMMUNITY

## 2023-02-24 RX ORDER — ACETAMINOPHEN 325 MG/1
650 TABLET ORAL EVERY 6 HOURS PRN
Status: DISCONTINUED | OUTPATIENT
Start: 2023-02-24 | End: 2023-02-26

## 2023-02-24 RX ORDER — MAGNESIUM OXIDE 400 MG/1
400 TABLET ORAL DAILY
COMMUNITY

## 2023-02-24 RX ORDER — MORPHINE SULFATE 4 MG/ML
2 INJECTION INTRAVENOUS ONCE
Status: COMPLETED | OUTPATIENT
Start: 2023-02-24 | End: 2023-02-24

## 2023-02-24 RX ORDER — ONDANSETRON 4 MG/1
4 TABLET, ORALLY DISINTEGRATING ORAL EVERY 4 HOURS PRN
Status: DISCONTINUED | OUTPATIENT
Start: 2023-02-24 | End: 2023-02-27 | Stop reason: HOSPADM

## 2023-02-24 RX ORDER — ENOXAPARIN SODIUM 100 MG/ML
40 INJECTION SUBCUTANEOUS DAILY
Status: DISCONTINUED | OUTPATIENT
Start: 2023-02-24 | End: 2023-02-25

## 2023-02-24 RX ORDER — ATORVASTATIN CALCIUM 10 MG/1
10 TABLET, FILM COATED ORAL NIGHTLY
Status: DISCONTINUED | OUTPATIENT
Start: 2023-02-24 | End: 2023-02-25

## 2023-02-24 RX ORDER — DEXTROSE MONOHYDRATE 50 MG/ML
INJECTION, SOLUTION INTRAVENOUS CONTINUOUS
Status: DISCONTINUED | OUTPATIENT
Start: 2023-02-24 | End: 2023-02-25

## 2023-02-24 RX ORDER — DILTIAZEM HYDROCHLORIDE 5 MG/ML
20 INJECTION INTRAVENOUS ONCE
Status: COMPLETED | OUTPATIENT
Start: 2023-02-24 | End: 2023-02-24

## 2023-02-24 RX ORDER — OMEPRAZOLE 20 MG/1
20 CAPSULE, DELAYED RELEASE ORAL 2 TIMES DAILY
COMMUNITY

## 2023-02-24 RX ORDER — NICOTINE 21 MG/24HR
1 PATCH, TRANSDERMAL 24 HOURS TRANSDERMAL EVERY 24 HOURS
COMMUNITY

## 2023-02-24 RX ORDER — ONDANSETRON 2 MG/ML
4 INJECTION INTRAMUSCULAR; INTRAVENOUS EVERY 4 HOURS PRN
Status: DISCONTINUED | OUTPATIENT
Start: 2023-02-24 | End: 2023-02-27 | Stop reason: HOSPADM

## 2023-02-24 RX ADMIN — ONDANSETRON 4 MG: 2 INJECTION INTRAMUSCULAR; INTRAVENOUS at 13:16

## 2023-02-24 RX ADMIN — AMIODARONE HYDROCHLORIDE 150 MG: 1.5 INJECTION, SOLUTION INTRAVENOUS at 15:30

## 2023-02-24 RX ADMIN — AMIODARONE HYDROCHLORIDE 0.5 MG/MIN: 50 INJECTION, SOLUTION INTRAVENOUS at 23:59

## 2023-02-24 RX ADMIN — ALBUTEROL SULFATE 2.5 MG: 2.5 SOLUTION RESPIRATORY (INHALATION) at 12:30

## 2023-02-24 RX ADMIN — POTASSIUM CHLORIDE 10 MEQ: 7.46 INJECTION, SOLUTION INTRAVENOUS at 16:53

## 2023-02-24 RX ADMIN — POTASSIUM CHLORIDE 10 MEQ: 7.46 INJECTION, SOLUTION INTRAVENOUS at 19:29

## 2023-02-24 RX ADMIN — SODIUM CHLORIDE, POTASSIUM CHLORIDE, SODIUM LACTATE AND CALCIUM CHLORIDE 1000 ML: 600; 310; 30; 20 INJECTION, SOLUTION INTRAVENOUS at 14:36

## 2023-02-24 RX ADMIN — POTASSIUM CHLORIDE 10 MEQ: 7.46 INJECTION, SOLUTION INTRAVENOUS at 15:36

## 2023-02-24 RX ADMIN — POTASSIUM CHLORIDE 10 MEQ: 7.46 INJECTION, SOLUTION INTRAVENOUS at 18:13

## 2023-02-24 RX ADMIN — SODIUM CHLORIDE 250 ML: 9 INJECTION, SOLUTION INTRAVENOUS at 13:30

## 2023-02-24 RX ADMIN — DILTIAZEM HYDROCHLORIDE 20 MG: 5 INJECTION INTRAVENOUS at 12:49

## 2023-02-24 RX ADMIN — NICOTINE TRANSDERMAL SYSTEM 21 MG: 21 PATCH, EXTENDED RELEASE TRANSDERMAL at 19:32

## 2023-02-24 RX ADMIN — MORPHINE SULFATE 2 MG: 4 INJECTION INTRAVENOUS at 12:59

## 2023-02-24 RX ADMIN — ENOXAPARIN SODIUM 40 MG: 100 INJECTION SUBCUTANEOUS at 19:32

## 2023-02-24 RX ADMIN — ONDANSETRON 4 MG: 2 INJECTION INTRAMUSCULAR; INTRAVENOUS at 12:59

## 2023-02-24 RX ADMIN — METHYLPREDNISOLONE SODIUM SUCCINATE 40 MG: 40 INJECTION, POWDER, FOR SOLUTION INTRAMUSCULAR; INTRAVENOUS at 12:28

## 2023-02-24 RX ADMIN — DEXTROSE MONOHYDRATE: 50 INJECTION, SOLUTION INTRAVENOUS at 15:30

## 2023-02-24 RX ADMIN — AMIODARONE HYDROCHLORIDE 1 MG/MIN: 50 INJECTION, SOLUTION INTRAVENOUS at 16:54

## 2023-02-24 ASSESSMENT — COPD QUESTIONNAIRES
HAVE YOU SMOKED AT LEAST 100 CIGARETTES IN YOUR ENTIRE LIFE: YES
COPD SCREENING SCORE: 9
DURING THE PAST 4 WEEKS HOW MUCH DID YOU FEEL SHORT OF BREATH: MOST  OR ALL OF THE TIME
DO YOU EVER COUGH UP ANY MUCUS OR PHLEGM?: YES, A FEW DAYS A WEEK OR MONTH

## 2023-02-24 ASSESSMENT — FIBROSIS 4 INDEX: FIB4 SCORE: 0.89

## 2023-02-24 ASSESSMENT — PULMONARY FUNCTION TESTS
EPAP_CMH2O: 8
EPAP_CMH2O: 6

## 2023-02-24 NOTE — ED PROVIDER NOTES
ED Provider Note    CHIEF COMPLAINT  Chief Complaint   Patient presents with    Shortness of Breath     Pt arrives on 15L NRB in acute distress. Pt AOx4 per EMS. Now unable to answer questions.        EXTERNAL RECORDS REVIEWED  Outpatient Notes recent anesthesia evaluation, as well as her admission to the hospital, see details below    HPI/ROS  LIMITATION TO HISTORY   Select: Mental status  OUTSIDE HISTORIAN(S):  Family daughter at the bedside augments the history, and provides quite a bit of detail as listed below and EMS DuoNeb given with better respiratory status    Pauline Galvez is a 72 y.o. adult who presents with respiratory distress.  She was recently hospital diagnosed with a mediastinal mass.  She was treated for respiratory distress at that time.  She ended going home with a follow-up yesterday for endoscopic biopsy, however anesthesia canceled her case pointing out that she would be at catastrophic risk for intubation/anesthesia.  Her daughter points out that was a really long day, coming in from cold [place], and adding onto that the condition of the roads.  This morning however, she seemed more tired and fatigued than usual.  This prompted them to call 911.  EMS reports they gave her a DuoNeb, which improved her breathing.  I was called to the bedside as the patient was in acute respiratory distress.  Initially she was completely unresponsive.  I restarted BiPAP, and this allows her to give a history.  She is having abdominal pain, just does not feel well.  She has not had a bowel movement today.  Breathing is improved.  Both the patient and daughter point out that she is DNR/DNI.  Daughter points out that she has not been eating or drinking very well, she is concerned about the possibility of aspiration.  She has been taking ice chips although still is concerned she may be dehydrated.  Case was also discussed with Dr. Nayak from radiation oncology.  He points out that at this time, he does not have  "any tissue diagnosis to go with her therapy.  He has hunch is that this is lymphoma, and she will get better with therapy.    PAST MEDICAL HISTORY     As above  SURGICAL HISTORY  patient denies any surgical history    FAMILY HISTORY  No family history on file.    SOCIAL HISTORY        CURRENT MEDICATIONS  Noted  ALLERGIES  Not on File    PHYSICAL EXAM  VITAL SIGNS: BP (!) 109/91   Pulse (!) 180   Temp 37.1 °C (98.8 °F) (Temporal)   Resp (!) 31   Ht 1.676 m (5' 6\")   Wt 57.2 kg (126 lb)   SpO2 97%   BMI 20.34 kg/m²    Constitutional: Only responsive, respiratory distress  HENT: Head is without trauma.  Oropharynx is clear.  Mucous membranes are moist.  Eyes: Sclerae are nonicteric, pupils are equally round.  Neck: Supple with grossly normal range of motion.  Cardiovascular: Heart is regular rate and rhythm without murmur rub or gallop.  Peripheral pulses are intact and symmetric throughout.  Thorax & Lungs: Diffuse wheeze  Abdomen: Bowel sounds normal, soft, non-distended, nontender, no mass nor pulsatile mass. I do not appreciate hepatosplenomegaly.  Skin: No apparent rash.  I do not see petechiae or purpura.  Extremities: No evidence of acute trauma.  No clubbing, cyanosis, edema, no Homans or cords.  Neurologic: Initially minimally responsive. Moving all extremities.  Intact sensation and strength throughout.  Gait is not assessed  Psychiatric: Flat, not abnormal for situation      DIAGNOSTIC STUDIES / PROCEDURES  EKG #1  I have independently interpreted this EKG  Twelve-lead study showing sinus tachycardia rate of 127.  No ST segment or T wave changes.  Impression no ST segment elevation myocardial infarction.    EKG #2  I have independently interpreted this EKG  Twelve-lead study showing an atrial fibrillation at a rate of 193.  No obvious ST segment or T wave changes.  Impression: Subsequent development of atrial fibrillation with rapid ventricular response.    LABS  Labs Reviewed   CBC WITH DIFFERENTIAL " - Abnormal; Notable for the following components:       Result Value    WBC 16.6 (*)     MCHC 32.5 (*)     Platelet Count 492 (*)     Neutrophils-Polys 83.80 (*)     Lymphocytes 7.10 (*)     Neutrophils (Absolute) 13.91 (*)     Monos (Absolute) 1.34 (*)     All other components within normal limits    Narrative:     Biotin intake of greater than 5 mg per day may interfere with  troponin levels, causing false low values.   COMP METABOLIC PANEL - Abnormal; Notable for the following components:    Potassium 3.2 (*)     Co2 34 (*)     Glucose 172 (*)     Bun 7 (*)     Creatinine 0.29 (*)     Globulin 3.8 (*)     All other components within normal limits    Narrative:     Biotin intake of greater than 5 mg per day may interfere with  troponin levels, causing false low values.   PROBRAIN NATRIURETIC PEPTIDE, NT - Abnormal; Notable for the following components:    NT-proBNP 466 (*)     All other components within normal limits    Narrative:     Biotin intake of greater than 5 mg per day may interfere with  troponin levels, causing false low values.   TROPONIN    Narrative:     Biotin intake of greater than 5 mg per day may interfere with  troponin levels, causing false low values.   LACTIC ACID    Narrative:     Biotin intake of greater than 5 mg per day may interfere with  troponin levels, causing false low values.   ESTIMATED GFR    Narrative:     Biotin intake of greater than 5 mg per day may interfere with  troponin levels, causing false low values.   CORRECTED CALCIUM    Narrative:     Biotin intake of greater than 5 mg per day may interfere with  troponin levels, causing false low values.   LACTIC ACID   LACTIC ACID   BLOOD CULTURE   BLOOD CULTURE         RADIOLOGY  I have independently interpreted the diagnostic imaging associated with this visit and am waiting the final reading from the radiologist.   My preliminary interpretation is a follows: No obvious infiltrate  Radiologist interpretation:   DX-CHEST-PORTABLE  (1 VIEW)   Final Result      Large left hilar/perihilar mass density with volume loss in the left hemithorax and surrounding increased interstitial markings. Findings highly suspicious for malignancy possible lymphangitic tumor involvement.            COURSE & MEDICAL DECISION MAKING    ED Observation Status? No; Patient does not meet criteria for ED Observation.     INITIAL ASSESSMENT, COURSE AND PLAN  Care Narrative: Patient presents in acute respiratory distress.  Parents did give a cogent history earlier, however she is minimal responsive at this time.  Likely needs to be intubated.  With a history of large mediastinal mass concerning for neoplasm, not sure what her CODE STATUS is.  She was initially registered under a unknown patient as her chart was still in use at [facility] where she was declined by anesthesia for intubation, they recommended that she have this done in the operating suite with CT surgery standing by.  In that chart, does note that she is DNR/DNI.    I did get a hold of her daughter who states she is a couple blocks away, will be arriving.  This was at 1228.  Subsequently, I started the patient on BiPAP.  Her therapy and I discussed this.    Patient was reevaluated at 1300, and is tolerating BiPAP quite well.  She is now able to answer yes and no questions.  Daughter is at the bedside.  I ordered her steroids, given her initial presentation and not think she contemporary orals, therefore it was given IV.  We written for another DuoNeb, as well, her rhythm appears to be atrial fibrillation with RVR, we are giving her diltiazem.  This was discussed with pharmacist, Nils.  Also giving her some morphine to help tolerate the mask and general comfort.  I have clarified with her and her daughter the patient is not to be intubated or resuscitated, and this order is put into the chart.    1324: LAboratories are slowly returning.  Her white blood count is elevated at 16.6.  BNP mildly elevated at  466.  Creatinine is normal.    1340: Case discussed with Dr Oliva, ICU. He will be admitting her and is here to see her now. She continues to look great, much improved. I asked nursing to call RT to give her a break from mask and see how she does.  She will still however need the ICU.        ADDITIONAL PROBLEM LIST  Atrial fibrillation  Mediastinal mass  Respiratory distress possible COPD exacerbation  DISPOSITION AND DISCUSSIONS  I have discussed management of the patient with the following physicians and LUIS's: Radiation oncology, ICU    Discussion of management with other QHP or appropriate source(s): Pharmacy agrees with morphine, may try Precedex, RT as above,, and Social Work discussed case with her, was able to get a hold of the daughter for me      FINAL DIAGNOSIS  1. Respiratory distress    2.  Suspect COPD exacerbation  3.  Mediastinal mass  4.  Atrial fibrillation with RVR  5.  Critical care time, 45 minutes, which includes obtaining history from patient and/or family/prehospital historians, examination of patient, reevaluation of patient, direction of nursing staff, and discussion with admitting and consulting physicians. It does not include any procedures.       Electronically signed by: Uri Pineda M.D., 2/24/2023 1:18 PM

## 2023-02-24 NOTE — ED NOTES
Pt removed from BiPAP temporarily due to nausea and chance of vomiting. RT at bedside. ERP notified. Orders received for zofran. Pt medicated per MAR and placed back on BiPAP

## 2023-02-24 NOTE — CONSULTS
Critical Care Consultation    Date of consult: 2/24/2023    Referring Physician  Uri Pineda M.D.    Reason for Consultation  Respiratory distress, placed on BiPAP in ED, large mediastinal mass without tissue diagnosis    History of Presenting Illness  Shantal Quach (MRN [id number]) is a 72 y.o. F, PMH IDDM, HTN, 70-pack-year history of tobacco use, active smoker who presented 2/24/2023 with respiratory distress, known mediastinal mass and placed on BiPAP in ED.  She was discharged home from [facility] 2/22/2023 after being admitted with complaints of dyspnea over 5-6 weeks, 40 pound unintentional weight loss over 4 months and found to have large mediastinal mass with left pleural effusion.  She underwent thoracentesis which just showed inflammatory cells no malignant cells.  She has vocal cord dysfunction and high risk for aspiration but elected against NG tube or enteral nutrition.  She was discharged home with plan for outpatient EBUS for tissue diagnosis 2/23.  However this apparently was canceled by anesthesiology who felt the procedure will be too high risk to perform.  Patient's daughter is at bedside and says patient has not really had much to eat or drink and they have had 2 very long days traveling back and forth from [facility] where she lives.  Daughter lives in [address] [place] [address] who has been staying with mom.  In ED, she was given nebulized bronchodilators for respiratory distress and placed on BiPAP.  She went into atrial fibrillation with rapid ventricular response has been given diltiazem.  I have initiated amiodarone infusion and given additional crystalloid bolus.  Patient and daughter both confirm DNR/DNI status and daughter would like most to focus to be on comfort but would still consider biopsy of some sort for tissue diagnosis to present all options for treatment but thinks mom would not really be interested in chemotherapy.    Code Status  DNAR/DNI    Review of Systems  Review of  Systems   Unable to perform ROS: Acuity of condition     Past Medical History   has no past medical history on file.    Surgical History   has no past surgical history on file.    Family History  family history is not on file.    Social History       Medications  Home Medications       Reviewed by Sarah Lou (Pharmacy Tech) on 02/24/23 at 1331  Med List Status: Complete     Medication Last Dose Status   atorvastatin (LIPITOR) 10 MG Tab 2/23/2023 Active   lisinopril (PRINIVIL) 5 MG Tab 2/24/2023 Active   magnesium oxide (MAG-OX) 400 MG Tab tablet 2/23/2023 Active   nicotine (NICODERM) 21 MG/24HR PATCH 24 HR 2/23/2023 Active   omeprazole (PRILOSEC) 20 MG delayed-release capsule 2/23/2023 Active   potassium chloride SA (KDUR) 20 MEQ Tab CR 2/23/2023 Active                  Current Facility-Administered Medications   Medication Dose Route Frequency Provider Last Rate Last Admin    albuterol (PROVENTIL) 2.5mg/0.5ml nebulizer solution 2.5 mg  2.5 mg Nebulization RT-PRN Uri Pineda M.D.   2.5 mg at 02/24/23 1230    dextrose 5% infusion   Intravenous Continuous Norberto Oliva M.D.        amiodarone (CORDARONE) 450 mg in dextrose 5% 250 mL Infusion  0.5-1 mg/min Intravenous Continuous Norberto Oliva M.D.        amiodarone (NEXTERONE) IVPB 150 mg  150 mg Intravenous Once Norberto Oliva M.D.        lactated ringers infusion (BOLUS)  1,000 mL Intravenous Once Norberto Oliva M.D.        potassium chloride (KCL) ivpb 10 mEq  10 mEq Intravenous Q HOUR Norberto Oliva M.D.         Current Outpatient Medications   Medication Sig Dispense Refill    atorvastatin (LIPITOR) 10 MG Tab Take 10 mg by mouth every evening.      lisinopril (PRINIVIL) 5 MG Tab Take 5 mg by mouth every day.      magnesium oxide (MAG-OX) 400 MG Tab tablet Take 400 mg by mouth every day.      nicotine (NICODERM) 21 MG/24HR PATCH 24 HR Place 1 Patch on the skin every 24 hours.      omeprazole (PRILOSEC) 20 MG delayed-release capsule Take 20 mg by  mouth 2 times a day.      potassium chloride SA (KDUR) 20 MEQ Tab CR Take 40 mEq by mouth every day.         Allergies  No Known Allergies    Vital Signs last 24 hours  Temp:  [37.1 °C (98.8 °F)] 37.1 °C (98.8 °F)  Pulse:  [124-180] 158  Resp:  [20-35] 21  BP: ()/(55-91) 122/68  SpO2:  [94 %-98 %] 97 %    Physical Exam  Physical Exam  Vitals and nursing note reviewed.   Constitutional:       Comments: Lethargic, on BiPAP, follows commands and answers questions   HENT:      Head: Normocephalic and atraumatic.      Mouth/Throat:      Mouth: Mucous membranes are dry.   Eyes:      Pupils: Pupils are equal, round, and reactive to light.   Cardiovascular:      Rate and Rhythm: Tachycardia present. Rhythm irregular.      Pulses: Normal pulses.   Pulmonary:      Comments: diminished breath sounds throughout, coarse, on BiPAP  Abdominal:      General: There is no distension.      Palpations: Abdomen is soft.      Tenderness: There is no abdominal tenderness.   Musculoskeletal:         General: No swelling or deformity.      Cervical back: Neck supple.   Skin:     General: Skin is warm and dry.      Capillary Refill: Capillary refill takes less than 2 seconds.   Neurological:      Comments: Allergic, weakly moves all extremities, nods, answers questions appropriately       Fluids    Intake/Output Summary (Last 24 hours) at 2023 1435  Last data filed at 2023 1421  Gross per 24 hour   Intake 250 ml   Output --   Net 250 ml       Laboratory  Recent Results (from the past 48 hour(s))   EKG    Collection Time: 23 12:06 PM   Result Value Ref Range    Report       [facility] Emergency Dept.    Test Date:  2023  Pt Name:    EFRAIN GORE                 Department: ER  MRN:        [id number]                      Room:       [facility]  Gender:                                  Technician: [id number]  :        1899                   Requested By:ER TRIAGE PROTOCOL  Order #:    [id number]                     Reading MD:    Measurements  Intervals                                Axis  Rate:       127                          P:          73  SC:         137                          QRS:        64  QRSD:       99                           T:          57  QT:         308  QTc:        448    Interpretive Statements  Sinus tachycardia  Ventricular premature complex  Probable left atrial enlargement  Low voltage, extremity and precordial leads  Baseline wander in lead(s) V4,V6  No previous ECG available for comparison     POCT glucose device results    Collection Time: 02/24/23 12:13 PM   Result Value Ref Range    POC Glucose, Blood 172 (H) 65 - 99 mg/dL   CBC w/ Differential    Collection Time: 02/24/23 12:14 PM   Result Value Ref Range    WBC 16.6 (H) 4.8 - 10.8 K/uL    RBC 5.36 4.70 - 6.10 M/uL    Hemoglobin 15.1 14.0 - 18.0 g/dL    Hematocrit 46.4 42.0 - 52.0 %    MCV 86.6 81.4 - 97.8 fL    MCH 28.2 27.0 - 33.0 pg    MCHC 32.5 (L) 33.6 - 35.0 g/dL    RDW 49.2 35.9 - 50.0 fL    Platelet Count 492 (H) 164 - 446 K/uL    MPV 9.4 9.0 - 12.9 fL    Neutrophils-Polys 83.80 (H) 44.00 - 72.00 %    Lymphocytes 7.10 (L) 22.00 - 41.00 %    Monocytes 8.10 0.00 - 13.40 %    Eosinophils 0.10 0.00 - 6.90 %    Basophils 0.40 0.00 - 1.80 %    Immature Granulocytes 0.50 0.00 - 0.90 %    Nucleated RBC 0.00 /100 WBC    Neutrophils (Absolute) 13.91 (H) 1.82 - 7.42 K/uL    Lymphs (Absolute) 1.18 1.00 - 4.80 K/uL    Monos (Absolute) 1.34 (H) 0.00 - 0.85 K/uL    Eos (Absolute) 0.01 0.00 - 0.51 K/uL    Baso (Absolute) 0.06 0.00 - 0.12 K/uL    Immature Granulocytes (abs) 0.09 0.00 - 0.11 K/uL    NRBC (Absolute) 0.00 K/uL   Complete Metabolic Panel (CMP)    Collection Time: 02/24/23 12:14 PM   Result Value Ref Range    Sodium 143 135 - 145 mmol/L    Potassium 3.2 (L) 3.6 - 5.5 mmol/L    Chloride 98 96 - 112 mmol/L    Co2 34 (H) 20 - 33 mmol/L    Anion Gap 11.0 7.0 - 16.0    Glucose 172 (H) 65 - 99 mg/dL    Bun 7 (L) 8 - 22 mg/dL     Creatinine 0.29 (L) 0.50 - 1.40 mg/dL    Calcium 9.8 8.5 - 10.5 mg/dL    AST(SGOT) 23 12 - 45 U/L    ALT(SGPT) 14 2 - 50 U/L    Alkaline Phosphatase 103 U/L    Total Bilirubin 0.3 0.1 - 1.5 mg/dL    Albumin 4.1 3.2 - 4.9 g/dL    Total Protein 7.9 6.0 - 8.2 g/dL    Globulin 3.8 (H) 1.9 - 3.5 g/dL    A-G Ratio 1.1 g/dL   Troponin STAT    Collection Time: 23 12:14 PM   Result Value Ref Range    Troponin T 15 6 - 19 ng/L   proBrain Natriuretic Peptide, NT    Collection Time: 23 12:14 PM   Result Value Ref Range    NT-proBNP 466 (H) 0 - 125 pg/mL   Lactic Acid    Collection Time: 23 12:14 PM   Result Value Ref Range    Lactic Acid 1.8 0.5 - 2.0 mmol/L   ESTIMATED GFR    Collection Time: 23 12:14 PM   Result Value Ref Range    GFR (CKD-EPI) 128 >60 mL/min/1.73 m 2   CORRECTED CALCIUM    Collection Time: 23 12:14 PM   Result Value Ref Range    Correct Calcium 9.7 8.5 - 10.5 mg/dL   EKG    Collection Time: 23 12:29 PM   Result Value Ref Range    Report       [facility] Emergency Dept.    Test Date:  2023  Pt Name:    EFRAIN GORE                 Department: ER  MRN:        [id number]                      Room:       [facility]  Gender:                                  Technician: [id number]  :        1950                   Requested By:KRYSTYNA WEINER  Order #:    [id number]                    Reading MD:    Measurements  Intervals                                Axis  Rate:       193                          P:  NE:                                      QRS:        72  QRSD:       70                           T:          -88  QT:         249  QTc:        446    Interpretive Statements  Atrial fibrillation with rapid V-rate  Repolarization abnormality, prob rate related  Baseline wander in lead(s) II  Compared to ECG 2023 12:06:26  Early repolarization now present  Sinus tachycardia no longer present  Ventricular premature complex(es) no longer present          Imaging  DX-CHEST-PORTABLE (1 VIEW)   Final Result      Large left hilar/perihilar mass density with volume loss in the left hemithorax and surrounding increased interstitial markings. Findings highly suspicious for malignancy possible lymphangitic tumor involvement.      CT chest 2/20      Assessment/Plan  Mediastinal mass  - extensive smoking hx and recent wt loss  - ddx lymphoma, lung ca; no bx done yet  - no malignant cell on recent L thora  - o/p EBUS cancelled d/t high risk  - signifcant airway compromise  - placed on BiPAP in ED  - d/w Pulmonary re: plan for bx; may be safer for IR TTNA bx    Acute respiratory failure  - rescue bipap - tolerating well  - continue BiPAP with breaks on HFNC  - nebs prn  - DNR/I confirmed    Atrial fibrilation with RVR  - rate control with BB if needed (HR <120)  - amiodarone bolus and infusion  - no ac with planned bx    IDDM  - glycemic control with SSI    Hypokalemia  - replete    Leukocytosis  - ? Leukemoid; doubt sepsis  - LA 1.8, follow    Dysphagia  - refused NGT, understands aspiration risk  - likely ongoing aspiration     SMoker  - NRT as needed    GOC  - d/w'd pt and dtr at bedside; DNR/DNI confirmed  - not sure she would want any aggressive rx  - will discuss in detail in am after chance to rest on bipap      Discussed patient condition and risk of morbidity and/or mortality with Family, RN, RT, and ERP, Pulm .    The patient remains critically ill.  Critical care time = 45 minutes in directly providing and coordinating critical care and extensive data review.  No time overlap and excludes procedures.

## 2023-02-24 NOTE — PROGRESS NOTES
4 Eyes Skin Assessment Completed by JASMYNE Robles and JASMYNE Villagran.    Head Redness  Ears WDL  Nose WDL  Mouth WDL  Neck WDL  Breast/Chest WDL  Shoulder Blades WDL  Spine WDL  (R) Arm/Elbow/Hand Bruising  (L) Arm/Elbow/Hand Bruising, scab, red blanching elbow  Abdomen WDL  Groin WDL  Scrotum/Coccyx/Buttocks Redness and Blanching  (R) Leg Redness knee  (L) Leg Redness knee  (R) Heel/Foot/Toe Redness and Blanching  (L) Heel/Foot/Toe Redness and Blanching          Devices In Places ECG, Blood Pressure Cuff, Pulse Ox, and SCD's BiPAP      Interventions In Place Sacral Mepilex, Pillows, Q2 Turns, and Low Air Loss Mattress    Possible Skin Injury No    Pictures Uploaded Into Epic N/A  Wound Consult Placed N/A  RN Wound Prevention Protocol Ordered No     Pt belongings received upon admission:  - Night gown  - blanket  - pair of slippers, pair of socks x2  - dentures top and bottom  - pair of jeans  - shirt  - hairbrush, chapstick, x2 razors

## 2023-02-24 NOTE — ED TRIAGE NOTES
Chief Complaint   Patient presents with    Shortness of Breath     Pt arrives on 15L NRB in acute distress. Pt AOx4 per EMS. Now unable to answer questions.      Pt BIB EMS for above complaint. 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 Pt has auditory crackles bilaterally with lung sounds. Pt normally on 2L NC. Now needing 15 L NRB. EMS gave duoneb and 4 mg zofran in route.   ERP and respiratory immediately called to bedside. Pt placed on BiPAP.  Pt unable to answer screening questions at this time.  BP (!) 160/80   Pulse (!) 124   Temp 37.1 °C (98.8 °F) (Temporal)   Resp (!) 24   SpO2 98%

## 2023-02-25 ENCOUNTER — HOME CARE VISIT (OUTPATIENT)
Dept: HOSPICE | Facility: HOSPICE | Age: 73
End: 2023-02-25

## 2023-02-25 ENCOUNTER — APPOINTMENT (OUTPATIENT)
Dept: RADIOLOGY | Facility: MEDICAL CENTER | Age: 73
DRG: 180 | End: 2023-02-25
Attending: INTERNAL MEDICINE
Payer: MEDICARE

## 2023-02-25 PROBLEM — Z51.5 HOSPICE CARE: Status: ACTIVE | Noted: 2023-02-25

## 2023-02-25 LAB
ALBUMIN SERPL BCP-MCNC: 3.4 G/DL (ref 3.2–4.9)
ALBUMIN/GLOB SERPL: 1 G/DL
ALP SERPL-CCNC: 81 U/L
ALT SERPL-CCNC: 13 U/L (ref 2–50)
ANION GAP SERPL CALC-SCNC: 10 MMOL/L (ref 7–16)
AST SERPL-CCNC: 21 U/L (ref 12–45)
BACTERIA BLD CULT: NORMAL
BACTERIA BLD CULT: NORMAL
BASOPHILS # BLD AUTO: 0.2 % (ref 0–1.8)
BASOPHILS # BLD: 0.02 K/UL (ref 0–0.12)
BILIRUB SERPL-MCNC: 0.3 MG/DL (ref 0.1–1.5)
BUN SERPL-MCNC: 9 MG/DL (ref 8–22)
CALCIUM ALBUM COR SERPL-MCNC: 9.8 MG/DL (ref 8.5–10.5)
CALCIUM SERPL-MCNC: 9.3 MG/DL (ref 8.5–10.5)
CHLORIDE SERPL-SCNC: 96 MMOL/L (ref 96–112)
CO2 SERPL-SCNC: 32 MMOL/L (ref 20–33)
CORTIS SERPL-MCNC: 23.9 UG/DL (ref 0–23)
CREAT SERPL-MCNC: 0.28 MG/DL (ref 0.5–1.4)
EOSINOPHIL # BLD AUTO: 0 K/UL (ref 0–0.51)
EOSINOPHIL NFR BLD: 0 % (ref 0–6.9)
ERYTHROCYTE [DISTWIDTH] IN BLOOD BY AUTOMATED COUNT: 49.5 FL (ref 35.9–50)
GFR SERPLBLD CREATININE-BSD FMLA CKD-EPI: 129 ML/MIN/1.73 M 2
GLOBULIN SER CALC-MCNC: 3.3 G/DL (ref 1.9–3.5)
GLUCOSE BLD STRIP.AUTO-MCNC: 131 MG/DL (ref 65–99)
GLUCOSE SERPL-MCNC: 139 MG/DL (ref 65–99)
HCT VFR BLD AUTO: 39.8 % (ref 42–52)
HGB BLD-MCNC: 12.9 G/DL (ref 14–18)
IMM GRANULOCYTES # BLD AUTO: 0.08 K/UL (ref 0–0.11)
IMM GRANULOCYTES NFR BLD AUTO: 0.6 % (ref 0–0.9)
INR PPP: 1.09 (ref 0.87–1.13)
LYMPHOCYTES # BLD AUTO: 1.33 K/UL (ref 1–4.8)
LYMPHOCYTES NFR BLD: 10.4 % (ref 22–41)
MAGNESIUM SERPL-MCNC: 1.5 MG/DL (ref 1.5–2.5)
MCH RBC QN AUTO: 28.5 PG (ref 27–33)
MCHC RBC AUTO-ENTMCNC: 32.4 G/DL (ref 33.6–35)
MCV RBC AUTO: 88.1 FL (ref 81.4–97.8)
MONOCYTES # BLD AUTO: 1.31 K/UL (ref 0–0.85)
MONOCYTES NFR BLD AUTO: 10.3 % (ref 0–13.4)
NEUTROPHILS # BLD AUTO: 10.02 K/UL (ref 1.82–7.42)
NEUTROPHILS NFR BLD: 78.5 % (ref 44–72)
NRBC # BLD AUTO: 0 K/UL
NRBC BLD-RTO: 0 /100 WBC
PLATELET # BLD AUTO: 361 K/UL (ref 164–446)
PMV BLD AUTO: 9.3 FL (ref 9–12.9)
POTASSIUM SERPL-SCNC: 3.7 MMOL/L (ref 3.6–5.5)
PROT SERPL-MCNC: 6.7 G/DL (ref 6–8.2)
PROTHROMBIN TIME: 14 SEC (ref 12–14.6)
RBC # BLD AUTO: 4.52 M/UL (ref 4.7–6.1)
SIGNIFICANT IND 70042: NORMAL
SIGNIFICANT IND 70042: NORMAL
SITE SITE: NORMAL
SITE SITE: NORMAL
SODIUM SERPL-SCNC: 138 MMOL/L (ref 135–145)
SOURCE SOURCE: NORMAL
SOURCE SOURCE: NORMAL
WBC # BLD AUTO: 12.8 K/UL (ref 4.8–10.8)

## 2023-02-25 PROCEDURE — 82962 GLUCOSE BLOOD TEST: CPT

## 2023-02-25 PROCEDURE — C9113 INJ PANTOPRAZOLE SODIUM, VIA: HCPCS | Performed by: NURSE PRACTITIONER

## 2023-02-25 PROCEDURE — 700111 HCHG RX REV CODE 636 W/ 250 OVERRIDE (IP): Performed by: HOSPITALIST

## 2023-02-25 PROCEDURE — 700101 HCHG RX REV CODE 250: Performed by: NURSE PRACTITIONER

## 2023-02-25 PROCEDURE — 770004 HCHG ROOM/CARE - ONCOLOGY PRIVATE *

## 2023-02-25 PROCEDURE — 99223 1ST HOSP IP/OBS HIGH 75: CPT | Performed by: HOSPITALIST

## 2023-02-25 PROCEDURE — 85610 PROTHROMBIN TIME: CPT

## 2023-02-25 PROCEDURE — 71045 X-RAY EXAM CHEST 1 VIEW: CPT

## 2023-02-25 PROCEDURE — 99291 CRITICAL CARE FIRST HOUR: CPT | Performed by: INTERNAL MEDICINE

## 2023-02-25 PROCEDURE — 700111 HCHG RX REV CODE 636 W/ 250 OVERRIDE (IP): Performed by: INTERNAL MEDICINE

## 2023-02-25 PROCEDURE — 80053 COMPREHEN METABOLIC PANEL: CPT

## 2023-02-25 PROCEDURE — 82533 TOTAL CORTISOL: CPT

## 2023-02-25 PROCEDURE — 94640 AIRWAY INHALATION TREATMENT: CPT

## 2023-02-25 PROCEDURE — 85025 COMPLETE CBC W/AUTO DIFF WBC: CPT

## 2023-02-25 PROCEDURE — 83735 ASSAY OF MAGNESIUM: CPT

## 2023-02-25 PROCEDURE — 700111 HCHG RX REV CODE 636 W/ 250 OVERRIDE (IP): Performed by: NURSE PRACTITIONER

## 2023-02-25 RX ORDER — HYDRALAZINE HYDROCHLORIDE 20 MG/ML
10 INJECTION INTRAMUSCULAR; INTRAVENOUS EVERY 6 HOURS PRN
Status: DISCONTINUED | OUTPATIENT
Start: 2023-02-25 | End: 2023-02-25

## 2023-02-25 RX ORDER — MORPHINE SULFATE 4 MG/ML
4 INJECTION INTRAVENOUS
Status: DISCONTINUED | OUTPATIENT
Start: 2023-02-25 | End: 2023-02-26

## 2023-02-25 RX ORDER — LABETALOL HYDROCHLORIDE 5 MG/ML
10 INJECTION, SOLUTION INTRAVENOUS EVERY 4 HOURS PRN
Status: DISCONTINUED | OUTPATIENT
Start: 2023-02-25 | End: 2023-02-25

## 2023-02-25 RX ORDER — LORAZEPAM 2 MG/ML
2 INJECTION INTRAMUSCULAR
Status: DISCONTINUED | OUTPATIENT
Start: 2023-02-25 | End: 2023-02-26

## 2023-02-25 RX ORDER — POTASSIUM CHLORIDE 20 MEQ/1
40 TABLET, EXTENDED RELEASE ORAL DAILY
Status: ACTIVE | OUTPATIENT
Start: 2023-02-25 | End: 2023-02-25

## 2023-02-25 RX ORDER — SODIUM CHLORIDE AND POTASSIUM CHLORIDE 150; 450 MG/100ML; MG/100ML
INJECTION, SOLUTION INTRAVENOUS CONTINUOUS
Status: DISCONTINUED | OUTPATIENT
Start: 2023-02-25 | End: 2023-02-25

## 2023-02-25 RX ORDER — PROCHLORPERAZINE EDISYLATE 5 MG/ML
10 INJECTION INTRAMUSCULAR; INTRAVENOUS EVERY 6 HOURS PRN
Status: DISCONTINUED | OUTPATIENT
Start: 2023-02-25 | End: 2023-02-27 | Stop reason: HOSPADM

## 2023-02-25 RX ORDER — MAGNESIUM SULFATE HEPTAHYDRATE 40 MG/ML
2 INJECTION, SOLUTION INTRAVENOUS ONCE
Status: DISCONTINUED | OUTPATIENT
Start: 2023-02-25 | End: 2023-02-25

## 2023-02-25 RX ORDER — PANTOPRAZOLE SODIUM 40 MG/10ML
40 INJECTION, POWDER, LYOPHILIZED, FOR SOLUTION INTRAVENOUS DAILY
Status: DISCONTINUED | OUTPATIENT
Start: 2023-02-25 | End: 2023-02-25

## 2023-02-25 RX ORDER — POTASSIUM CHLORIDE 7.45 MG/ML
10 INJECTION INTRAVENOUS
Status: DISCONTINUED | OUTPATIENT
Start: 2023-02-25 | End: 2023-02-25

## 2023-02-25 RX ADMIN — MAGNESIUM SULFATE HEPTAHYDRATE 2 G: 40 INJECTION, SOLUTION INTRAVENOUS at 08:50

## 2023-02-25 RX ADMIN — MORPHINE SULFATE 4 MG: 4 INJECTION, SOLUTION INTRAMUSCULAR; INTRAVENOUS at 22:47

## 2023-02-25 RX ADMIN — PROCHLORPERAZINE EDISYLATE 10 MG: 5 INJECTION INTRAMUSCULAR; INTRAVENOUS at 15:30

## 2023-02-25 RX ADMIN — ONDANSETRON 4 MG: 2 INJECTION INTRAMUSCULAR; INTRAVENOUS at 08:42

## 2023-02-25 RX ADMIN — PANTOPRAZOLE SODIUM 40 MG: 40 INJECTION, POWDER, LYOPHILIZED, FOR SOLUTION INTRAVENOUS at 05:53

## 2023-02-25 RX ADMIN — LORAZEPAM 2 MG: 2 INJECTION INTRAMUSCULAR; INTRAVENOUS at 15:33

## 2023-02-25 RX ADMIN — POTASSIUM CHLORIDE 10 MEQ: 7.46 INJECTION, SOLUTION INTRAVENOUS at 08:47

## 2023-02-25 RX ADMIN — MORPHINE SULFATE 4 MG: 4 INJECTION, SOLUTION INTRAMUSCULAR; INTRAVENOUS at 14:47

## 2023-02-25 RX ADMIN — MORPHINE SULFATE 4 MG: 4 INJECTION, SOLUTION INTRAMUSCULAR; INTRAVENOUS at 12:34

## 2023-02-25 RX ADMIN — ONDANSETRON 4 MG: 2 INJECTION INTRAMUSCULAR; INTRAVENOUS at 14:47

## 2023-02-25 RX ADMIN — ONDANSETRON 4 MG: 2 INJECTION INTRAMUSCULAR; INTRAVENOUS at 00:46

## 2023-02-25 RX ADMIN — LABETALOL HYDROCHLORIDE 10 MG: 5 INJECTION, SOLUTION INTRAVENOUS at 06:35

## 2023-02-25 ASSESSMENT — ENCOUNTER SYMPTOMS
FEVER: 0
BACK PAIN: 1
ABDOMINAL PAIN: 0
SPUTUM PRODUCTION: 1
DIZZINESS: 0
PALPITATIONS: 0
CHILLS: 0
COUGH: 1
HEADACHES: 0
SHORTNESS OF BREATH: 1

## 2023-02-25 NOTE — PROGRESS NOTES
Critical Care Progress Note    Date of admission  2/24/2023    Chief Complaint  72 y.o. adult admitted 2/24/2023 with respiratory failure secondary to large mediastinal mass, placed on BiPAP in ED    Hospital Course  72 y.o. F, PMH IDDM, HTN, 70-pack-year history of tobacco use, active smoker who presented 2/24/2023 with respiratory distress, known mediastinal mass and placed on BiPAP in ED.  She was discharged home from [facility] 2/22/2023 after being admitted with complaints of dyspnea over 5-6 weeks, 40 pound unintentional weight loss over 4 months and found to have large mediastinal mass with left pleural effusion.  She underwent thoracentesis which just showed inflammatory cells no malignant cells.  She has vocal cord dysfunction and high risk for aspiration but elected against NG tube or enteral nutrition.  She was discharged home with plan for outpatient EBUS for tissue diagnosis 2/23.  However this apparently was canceled by anesthesiology who felt the procedure will be too high risk to perform.  Patient's daughter is at bedside and says patient has not really had much to eat or drink and they have had 2 very long days traveling back and forth from [facility] where she lives.  Daughter lives in [address] [place] [address] who has been staying with mom.  In ED, she was given nebulized bronchodilators for respiratory distress and placed on BiPAP.  She went into atrial fibrillation with rapid ventricular response has been given diltiazem.  I have initiated amiodarone infusion and given additional crystalloid bolus.  Patient and daughter both confirm DNR/DNI status and daughter would like most to focus to be on comfort but would still consider biopsy of some sort for tissue diagnosis to present all options for treatment but thinks mom would not really be interested in chemotherapy. ~ 2/24 2/25 - SR/ST overnight; amio gtt, SLP swallow eval, maintenance IV fluid, remains off BiPAP (patient does not prefer),  use HFNC/nasal cannula for some positive airway pressure with large mediastinal mass and some airway compression    Interval Problem Update  Reviewed last 24 hour events:  WBC 16.6-> 12.8  Tmax 98.8  HR 90 -110  Amiodarone  BiPAP overnight, currently 4 LPM oxygen nasal cannula      Review of Systems  Review of Systems   Unable to perform ROS: Acuity of condition      Vital Signs for last 24 hours   Temp:  [36.4 °C (97.5 °F)-37.1 °C (98.8 °F)] 36.5 °C (97.7 °F)  Pulse:  [] 106  Resp:  [15-53] 28  BP: ()/() 155/88  SpO2:  [92 %-100 %] 95 %    Hemodynamic parameters for last 24 hours       Respiratory Information for the last 24 hours       Physical Exam   Physical Exam  Vitals and nursing note reviewed.   Constitutional:       Comments: A/o, not in distress   HENT:      Head: Normocephalic and atraumatic.      Mouth/Throat:      Mouth: Mucous membranes are dry.   Eyes:      Pupils: Pupils are equal, round, and reactive to light.   Cardiovascular:      Rate and Rhythm: Tachycardia present. Rhythm irregular.      Pulses: Normal pulses.   Pulmonary:      Comments: diminished breath sounds, coarse rhonchi, no wheeze  Abdominal:      General: There is no distension.      Palpations: Abdomen is soft.      Tenderness: There is no abdominal tenderness.   Musculoskeletal:         General: No swelling or deformity.      Cervical back: Neck supple.   Skin:     General: Skin is warm and dry.      Capillary Refill: Capillary refill takes less than 2 seconds.   Neurological:      General: No focal deficit present.      Mental Status: Shantal Quach is oriented to person, place, and time.       Medications  Current Facility-Administered Medications   Medication Dose Route Frequency Provider Last Rate Last Admin    hydrALAZINE (APRESOLINE) injection 10 mg  10 mg Intravenous Q6HRS PRN Melissa L. Latona        labetalol (NORMODYNE/TRANDATE) injection 10 mg  10 mg Intravenous Q4HRS PRN Melissa L. Latona   10 mg at  02/25/23 0635    pantoprazole (Protonix) injection 40 mg  40 mg Intravenous DAILY Melissa MOREVanessa Latona   40 mg at 02/25/23 0553    albuterol (PROVENTIL) 2.5mg/0.5ml nebulizer solution 2.5 mg  2.5 mg Nebulization RT-PRN Uri Pineda M.D.   2.5 mg at 02/24/23 1230    dextrose 5% infusion   Intravenous Continuous Norberto Oliva M.D. 30 mL/hr at 02/25/23 0500 Rate Verify at 02/25/23 0500    amiodarone (CORDARONE) 450 mg in dextrose 5% 250 mL Infusion  0.5-1 mg/min Intravenous Continuous Norberto Oliva M.D. 17 mL/hr at 02/25/23 0500 0.5 mg/min at 02/25/23 0500    atorvastatin (LIPITOR) tablet 10 mg  10 mg Oral Nightly Norberto Oliva M.D.        lisinopril (PRINIVIL) tablet 5 mg  5 mg Oral DAILY Norberto Oliva M.D.        nicotine (NICODERM) 21 MG/24HR 21 mg  1 Patch Transdermal Q EVENING Norberto Oliva M.D.   21 mg at 02/24/23 1932    senna-docusate (PERICOLACE or SENOKOT S) 8.6-50 MG per tablet 2 Tablet  2 Tablet Oral BID Norberto Oliva M.D.        And    polyethylene glycol/lytes (MIRALAX) PACKET 1 Packet  1 Packet Oral QDAY PRN Norberto Oliva M.D.        And    magnesium hydroxide (MILK OF MAGNESIA) suspension 30 mL  30 mL Oral QDAY PRN Norberto Oliva M.D.        And    bisacodyl (DULCOLAX) suppository 10 mg  10 mg Rectal QDAY PRN Norberto Oliva M.D.        enoxaparin (Lovenox) inj 40 mg  40 mg Subcutaneous DAILY AT 1800 Norberto Oliva M.D.   40 mg at 02/24/23 1932    acetaminophen (Tylenol) tablet 650 mg  650 mg Oral Q6HRS PRN Norberto Oliva M.D.        ondansetron (ZOFRAN) syringe/vial injection 4 mg  4 mg Intravenous Q4HRS PRN Norberto Oliva M.D.   4 mg at 02/25/23 0046    ondansetron (ZOFRAN ODT) dispertab 4 mg  4 mg Oral Q4HRS PRN Norberto Oliva M.D.        insulin regular (HumuLIN R,NovoLIN R) injection  3-14 Units Subcutaneous 4X/DAY ACHS Norberto Oliva M.D.        And    dextrose 10 % BOLUS 25 g  25 g Intravenous Q15 MIN PRN Norberto Oliva M.D.           Fluids    Intake/Output Summary (Last 24  hours) at 2/25/2023 0646  Last data filed at 2/25/2023 0500  Gross per 24 hour   Intake 1547.14 ml   Output 600 ml   Net 947.14 ml       Laboratory          Recent Labs     02/24/23 1214 02/24/23 2215 02/25/23  0400   SODIUM 143 141 138   POTASSIUM 3.2* 4.6 3.7   CHLORIDE 98 99 96   CO2 34* 31 32   BUN 7* 8 9   CREATININE 0.29* 0.37* 0.28*   MAGNESIUM  --  1.5 1.5   CALCIUM 9.8 9.2 9.3     Recent Labs     02/24/23  1214 02/24/23 2215 02/25/23  0400   ALTSGPT 14  --  13   ASTSGOT 23  --  21   ALKPHOSPHAT 103  --  81   TBILIRUBIN 0.3  --  0.3   GLUCOSE 172* 188* 139*     Recent Labs     02/24/23 1214 02/25/23  0400   WBC 16.6* 12.8*   NEUTSPOLYS 83.80* 78.50*   LYMPHOCYTES 7.10* 10.40*   MONOCYTES 8.10 10.30   EOSINOPHILS 0.10 0.00   BASOPHILS 0.40 0.20   ASTSGOT 23 21   ALTSGPT 14 13   ALKPHOSPHAT 103 81   TBILIRUBIN 0.3 0.3     Recent Labs     02/24/23  1214 02/25/23  0400   RBC 5.36 4.52*   HEMOGLOBIN 15.1 12.9*   HEMATOCRIT 46.4 39.8*   PLATELETCT 492* 361   PROTHROMBTM  --  14.0   INR  --  1.09       Imaging  X-Ray:  I have personally reviewed the images and compared with prior images.    Assessment/Plan       Assessment/Plan  Mediastinal mass  - extensive smoking hx and recent wt loss  - ddx lymphoma, lung ca; no bx done yet  - no malignant cell on recent L thora  - o/p EBUS cancelled d/t high risk  - signifcant airway compromise  - placed on BiPAP in ED, off all night; try HFNC for some positive airway pressure  - d/w Pulmonary re: plan for bx; ? IR TTNA bx, ? Palliative xrt (Dr Barcenas Rad/onc aware)     Acute respiratory failure  - rescue bipap - tolerating well  - continue BiPAP with breaks on HFNC  - nebs prn  - DNR/I confirmed     Atrial fibrilation with RVR  - converted back to SR yesterday  - amiodarone infusion another 24 hours  - no ac with planned bx (vte ppx onlyl)     IDDM  - glycemic control with SSI     Hypokalemia  - replete     Leukocytosis  - ? Leukemoid; doubt sepsis  - LA 1.8, follow      Dysphagia  - refused NGT, understands aspiration risk  - likely ongoing aspiration      SMoker  - NRT as needed     GOC  - d/w'd pt and dtr at bedside; DNR/DNI confirmed  - not sure she would want any aggressive rx  - will discuss in detail in am after chance to rest on bipap       VTE:  Lovenox  Ulcer: PPI  Lines: None    I have performed a physical exam and reviewed and updated ROS and Plan today (2/25/2023). In review of yesterday's note (2/24/2023), there are no changes except as documented above.     Discussed patient condition and risk of morbidity and/or mortality with RN, RT, Pharmacy, and QA team  The patient remains critically ill.  Critical care time = 33 minutes in directly providing and coordinating critical care and extensive data review.  No time overlap and excludes procedures.

## 2023-02-25 NOTE — CARE PLAN
The patient is Watcher - Medium risk of patient condition declining or worsening    Shift Goals  Clinical Goals: Hemodynamic stability, respiratory recovery  Patient Goals: ANAHY  Family Goals: POA, information, pt to rest    Progress made toward(s) clinical / shift goals:  Rate control on HR and conversion back to SR/ST during shift after phagological interventions. Pt O2 sat 98-99% on bipap  Problem: Skin Integrity  Goal: Skin integrity is maintained or improved  Outcome: Progressing     Problem: Fall Risk  Goal: Patient will remain free from falls  Outcome: Progressing

## 2023-02-25 NOTE — CONSULTS
"Hospital Medicine Consultation    Date of Service  2/25/2023    Referring Physician  Norberto Oliva M.D.    Consulting Physician  Tj Arshad D.O.    Reason for Consultation  Assuming medical management    History of Presenting Illness  71yo PMHx DM, HTN, tobacco use with a recently diagnosed mediastinal mass.  Seen here originally on 2/22 with weight loss and weakness.  Mediastinal mass Dx'd at that time.  Dc'd to get an EBUS Bx  on 2/23 however when she arrived for the study, anesthesia felt airway was too high risk and procedure was canceled.  Presented back here on 2/24 again with resp distress and admitted to ICU for airway monitoring.  After discussion pt is now DNR/I, planned Bx by IR on Tue    On my examination, review of systems is somewhat limited as the patient has difficulty speaking.  She reports that she has pain in her chest and back.  She feels fatigued and short of breath.  She feels tired.  She states \"I am done\".  We had a conference with the patient including myself pulmonary Dr.s Ramirez and Dr. Thompson  And the patient's daughter at the bedside.  During this discussion the patient was quite clear that she did not want to pursue further efforts and diagnoses with biopsy, and also was not interested in potential palliative measures such as radiation therapy.  She was offered the option of becoming comfort care status and discharging home on hospice which she states is what she wants to do.  Throughout this conversation as noted she was quite clear about her wishes.    Review of Systems  Review of Systems   Unable to perform ROS: Other     Past Medical History   has no past medical history on file.    Surgical History   has no past surgical history on file.    Family History  family history is not on file.    Social History       Medications  Prior to Admission Medications   Prescriptions Last Dose Informant Patient Reported? Taking?   atorvastatin (LIPITOR) 10 MG Tab 2/23/2023 at unk  " Yes Yes   Sig: Take 10 mg by mouth every evening.   lisinopril (PRINIVIL) 5 MG Tab 2/24/2023 at am  Yes Yes   Sig: Take 5 mg by mouth every day.   magnesium oxide (MAG-OX) 400 MG Tab tablet 2/23/2023 at unk  Yes Yes   Sig: Take 400 mg by mouth every day.   nicotine (NICODERM) 21 MG/24HR PATCH 24 HR 2/23/2023 at pm  Yes Yes   Sig: Place 1 Patch on the skin every 24 hours.   omeprazole (PRILOSEC) 20 MG delayed-release capsule 2/23/2023 at unk  Yes Yes   Sig: Take 20 mg by mouth 2 times a day.   potassium chloride SA (KDUR) 20 MEQ Tab CR 2/23/2023 at unk  Yes Yes   Sig: Take 40 mEq by mouth every day.      Facility-Administered Medications: None       Allergies  No Known Allergies    Physical Exam  Temp:  [36.4 °C (97.5 °F)-37.1 °C (98.8 °F)] 36.5 °C (97.7 °F)  Pulse:  [] 106  Resp:  [15-53] 28  BP: ()/() 155/88  SpO2:  [92 %-100 %] 95 %    Physical Exam  Constitutional:       General: Shantal Quach is not in acute distress.     Appearance: Normal appearance. Shantal Quach is well-developed. Shantal Quach is cachectic. Shantal Quach is not diaphoretic.   HENT:      Head: Normocephalic and atraumatic.   Neck:      Vascular: No JVD.   Cardiovascular:      Rate and Rhythm: Normal rate and regular rhythm.      Heart sounds: No murmur heard.  Pulmonary:      Effort: Pulmonary effort is normal. No respiratory distress.      Breath sounds: No stridor. Wheezing present. No rales.   Abdominal:      Palpations: Abdomen is soft.      Tenderness: There is no abdominal tenderness. There is no guarding or rebound.   Musculoskeletal:         General: No tenderness.      Right lower leg: No edema.      Left lower leg: No edema.   Skin:     General: Skin is warm and dry.      Capillary Refill: Capillary refill takes less than 2 seconds.      Findings: No rash.   Neurological:      General: No focal deficit present.      Mental Status: Shantal Quach is alert and oriented to person, place,  and time.   Psychiatric:         Mood and Affect: Mood normal.         Behavior: Behavior normal.         Thought Content: Thought content normal.       Fluids      Laboratory  Recent Labs     02/24/23  1214 02/25/23  0400   WBC 16.6* 12.8*   RBC 5.36 4.52*   HEMOGLOBIN 15.1 12.9*   HEMATOCRIT 46.4 39.8*   MCV 86.6 88.1   MCH 28.2 28.5   MCHC 32.5* 32.4*   RDW 49.2 49.5   PLATELETCT 492* 361   MPV 9.4 9.3     Recent Labs     02/24/23  1214 02/24/23  2215 02/25/23  0400   SODIUM 143 141 138   POTASSIUM 3.2* 4.6 3.7   CHLORIDE 98 99 96   CO2 34* 31 32   GLUCOSE 172* 188* 139*   BUN 7* 8 9   CREATININE 0.29* 0.37* 0.28*   CALCIUM 9.8 9.2 9.3     Recent Labs     02/25/23  0400   INR 1.09                 Imaging  DX-CHEST-PORTABLE (1 VIEW)   Final Result         1. No significant interval change.      DX-CHEST-PORTABLE (1 VIEW)   Final Result      Large left hilar/perihilar mass density with volume loss in the left hemithorax and surrounding increased interstitial markings. Findings highly suspicious for malignancy possible lymphangitic tumor involvement.          Assessment/Plan  * Acute and chronic respiratory failure (acute-on-chronic) (HCC)- (present on admission)  Assessment & Plan  As noted long d/w pt and her daughter in which the pt was quite clear she does not wish further evaluation or palliative treatment  Now Comfort Care status and Hospice consulted    Hospice care  Assessment & Plan  Likely to have significant issues with air hunger and anxiety  MSO4 4 mg q5min prn air hunger, anxiety, pain  Ativan 2mg IV q5min prn anxiety, N, Sz, agitation  O2 support for comfort only  No issues with secretions at present  Hospice consulted  Pt's hope is to go home and pass with family

## 2023-02-25 NOTE — CONSULTS
"Pulmonary Consultation    Date of consult: 2/25/2023    Referring Physician  Tj Arshad, *    Reason for Consultation  Mediastinal mass    History of Presenting Illness  72 y.o. adult who presented 2/24/2023 with severe respiratory distress, sent to ICU on rescue BIPAP.  Patient well known to me from last admission.  We have been working to get her a tissue biopsy for the past week but have been delayed several times.  Her mass is large and significantly compressing her trachea, elena and bilateral mainstem bronchi.  Initially she was on board to obtain a tissue biopsy to know what we are dealing with.  She has been struggling to breathe for some time now.  She is also having significant dysphagia due to deviation of her esophagus and trachea due to he mass.      This morning she states she would rather die than go back on BIPAP.  She wishes to be DNR/DNI.  I had a conversation at bedside with Shantal and her daughter as well as Dr. Sellers. I explained the situation we are in and the options of trying to proceed with biopsy vs. Asking rad onc for palliative therapy without a diagnosis vs. Comfort measures.  She stated \"I'm done\".  She wants to be made comfortable and would prefer to get home if that is possible.  She continues on high flow today and understands she may need to remain in the hospital for the hospice process due to her respiratory needs.     Code Status  Comfort Care/DNR    Review of Systems  Review of Systems   Constitutional:  Positive for malaise/fatigue. Negative for chills and fever.   HENT:  Negative for congestion.    Respiratory:  Positive for cough, sputum production and shortness of breath.    Cardiovascular:  Positive for chest pain. Negative for palpitations and leg swelling.   Gastrointestinal:  Negative for abdominal pain.   Musculoskeletal:  Positive for back pain.   Neurological:  Negative for dizziness and headaches.     Past Medical History   has no past medical history " on file.    Surgical History   has no past surgical history on file.    Family History  family history is not on file.    Social History       Medications  Home Medications       Reviewed by Sarah Lou (Pharmacy Tech) on 02/24/23 at 1331  Med List Status: Complete     Medication Last Dose Status   atorvastatin (LIPITOR) 10 MG Tab 2/23/2023 Active   lisinopril (PRINIVIL) 5 MG Tab 2/24/2023 Active   magnesium oxide (MAG-OX) 400 MG Tab tablet 2/23/2023 Active   nicotine (NICODERM) 21 MG/24HR PATCH 24 HR 2/23/2023 Active   omeprazole (PRILOSEC) 20 MG delayed-release capsule 2/23/2023 Active   potassium chloride SA (KDUR) 20 MEQ Tab CR 2/23/2023 Active                  Current Facility-Administered Medications   Medication Dose Route Frequency Provider Last Rate Last Admin    potassium chloride SA (Kdur) tablet 40 mEq  40 mEq Oral DAILY Norberto Oliva M.D.        morphine 4 MG/ML injection 4 mg  4 mg Intravenous Q5 MIN PRN Tj Arshad D.O.   4 mg at 02/25/23 1234    LORazepam (ATIVAN) injection 2 mg  2 mg Intravenous Q5 MIN PRN Tj Arshad D.O.        prochlorperazine (COMPAZINE) injection 10 mg  10 mg Intravenous Q6HRS PRN Tj Arshad D.O.        acetaminophen (Tylenol) tablet 650 mg  650 mg Oral Q6HRS PRN Norberto Oliva M.D.        ondansetron (ZOFRAN) syringe/vial injection 4 mg  4 mg Intravenous Q4HRS PRN Norberto Oliva M.D.   4 mg at 02/25/23 0842    ondansetron (ZOFRAN ODT) dispertab 4 mg  4 mg Oral Q4HRS PRN Norberto Oliva M.D.           Allergies  No Known Allergies    Vital Signs last 24 hours  Temp:  [36.4 °C (97.5 °F)-37.1 °C (98.7 °F)] 36.7 °C (98 °F)  Pulse:  [] 104  Resp:  [15-53] 27  BP: (107-169)/() 160/84  SpO2:  [92 %-100 %] 99 %    Physical Exam  Physical Exam  Constitutional:       Appearance: Shantal Radha Quach is ill-appearing.   HENT:      Mouth/Throat:      Mouth: Mucous membranes are dry.   Eyes:      Extraocular Movements: Extraocular  movements intact.   Cardiovascular:      Rate and Rhythm: Regular rhythm. Tachycardia present.   Pulmonary:      Effort: Respiratory distress present.      Breath sounds: Normal breath sounds.   Abdominal:      Palpations: Abdomen is soft.   Musculoskeletal:         General: No swelling, tenderness or deformity.   Skin:     General: Skin is warm and dry.   Neurological:      General: No focal deficit present.      Mental Status: Shantal Quach is alert and oriented to person, place, and time.       Fluids    Intake/Output Summary (Last 24 hours) at 2023 1404  Last data filed at 2023 1200  Gross per 24 hour   Intake 2948.18 ml   Output 1150 ml   Net 1798.18 ml       Laboratory  Recent Results (from the past 48 hour(s))   EKG    Collection Time: 23 12:06 PM   Result Value Ref Range    Report       [facility] Emergency Dept.    Test Date:  2023  Pt Name:    EFRAIN GORE                 Department: ER  MRN:        [id number]                      Room:       [facility]  Gender:                                  Technician: [id number]  :        1899                   Requested By:ER TRIAGE PROTOCOL  Order #:    [id number]                    Reading MD:    Measurements  Intervals                                Axis  Rate:       127                          P:          73  CT:         137                          QRS:        64  QRSD:       99                           T:          57  QT:         308  QTc:        448    Interpretive Statements  Sinus tachycardia  Ventricular premature complex  Probable left atrial enlargement  Low voltage, extremity and precordial leads  Baseline wander in lead(s) V4,V6  No previous ECG available for comparison     POCT glucose device results    Collection Time: 23 12:13 PM   Result Value Ref Range    POC Glucose, Blood 172 (H) 65 - 99 mg/dL   CBC w/ Differential    Collection Time: 23 12:14 PM   Result Value Ref Range    WBC 16.6 (H) 4.8  - 10.8 K/uL    RBC 5.36 4.70 - 6.10 M/uL    Hemoglobin 15.1 14.0 - 18.0 g/dL    Hematocrit 46.4 42.0 - 52.0 %    MCV 86.6 81.4 - 97.8 fL    MCH 28.2 27.0 - 33.0 pg    MCHC 32.5 (L) 33.6 - 35.0 g/dL    RDW 49.2 35.9 - 50.0 fL    Platelet Count 492 (H) 164 - 446 K/uL    MPV 9.4 9.0 - 12.9 fL    Neutrophils-Polys 83.80 (H) 44.00 - 72.00 %    Lymphocytes 7.10 (L) 22.00 - 41.00 %    Monocytes 8.10 0.00 - 13.40 %    Eosinophils 0.10 0.00 - 6.90 %    Basophils 0.40 0.00 - 1.80 %    Immature Granulocytes 0.50 0.00 - 0.90 %    Nucleated RBC 0.00 /100 WBC    Neutrophils (Absolute) 13.91 (H) 1.82 - 7.42 K/uL    Lymphs (Absolute) 1.18 1.00 - 4.80 K/uL    Monos (Absolute) 1.34 (H) 0.00 - 0.85 K/uL    Eos (Absolute) 0.01 0.00 - 0.51 K/uL    Baso (Absolute) 0.06 0.00 - 0.12 K/uL    Immature Granulocytes (abs) 0.09 0.00 - 0.11 K/uL    NRBC (Absolute) 0.00 K/uL   Complete Metabolic Panel (CMP)    Collection Time: 02/24/23 12:14 PM   Result Value Ref Range    Sodium 143 135 - 145 mmol/L    Potassium 3.2 (L) 3.6 - 5.5 mmol/L    Chloride 98 96 - 112 mmol/L    Co2 34 (H) 20 - 33 mmol/L    Anion Gap 11.0 7.0 - 16.0    Glucose 172 (H) 65 - 99 mg/dL    Bun 7 (L) 8 - 22 mg/dL    Creatinine 0.29 (L) 0.50 - 1.40 mg/dL    Calcium 9.8 8.5 - 10.5 mg/dL    AST(SGOT) 23 12 - 45 U/L    ALT(SGPT) 14 2 - 50 U/L    Alkaline Phosphatase 103 U/L    Total Bilirubin 0.3 0.1 - 1.5 mg/dL    Albumin 4.1 3.2 - 4.9 g/dL    Total Protein 7.9 6.0 - 8.2 g/dL    Globulin 3.8 (H) 1.9 - 3.5 g/dL    A-G Ratio 1.1 g/dL   Troponin STAT    Collection Time: 02/24/23 12:14 PM   Result Value Ref Range    Troponin T 15 6 - 19 ng/L   proBrain Natriuretic Peptide, NT    Collection Time: 02/24/23 12:14 PM   Result Value Ref Range    NT-proBNP 466 (H) 0 - 125 pg/mL   Lactic Acid    Collection Time: 02/24/23 12:14 PM   Result Value Ref Range    Lactic Acid 1.8 0.5 - 2.0 mmol/L   ESTIMATED GFR    Collection Time: 02/24/23 12:14 PM   Result Value Ref Range    GFR (CKD-EPI) 128 >60  mL/min/1.73 m 2   CORRECTED CALCIUM    Collection Time: 23 12:14 PM   Result Value Ref Range    Correct Calcium 9.7 8.5 - 10.5 mg/dL   EKG    Collection Time: 23 12:29 PM   Result Value Ref Range    Report       [facility] Emergency Dept.    Test Date:  2023  Pt Name:    EFRAIN GORE                 Department: ER  MRN:        [id number]                      Room:       [facility]  Gender:                                  Technician: [id number]  :        1950                   Requested By:KRYSTYNA WEINER  Order #:    [id number]                    Reading MD:    Measurements  Intervals                                Axis  Rate:       193                          P:  [place]:                                      QRS:        72  QRSD:       70                           T:          -88  QT:         249  QTc:        446    Interpretive Statements  Atrial fibrillation with rapid V-rate  Repolarization abnormality, prob rate related  Baseline wander in lead(s) II  Compared to ECG 2023 12:06:26  Early repolarization now present  Sinus tachycardia no longer present  Ventricular premature complex(es) no longer present     Blood Culture    Collection Time: 23  1:05 PM    Specimen: Peripheral; Blood   Result Value Ref Range    Significant Indicator NEG     Source BLD     Site PERIPHERAL     Culture Result       No Growth  Note: Blood cultures are incubated for 5 days and  are monitored continuously.Positive blood cultures  are called to the RN and reported as soon as  they are identified.     Blood Culture    Collection Time: 23  1:05 PM    Specimen: Peripheral; Blood   Result Value Ref Range    Significant Indicator POS (POS)     Source BLD     Site PERIPHERAL     Culture Result (A)      Growth detected by Bactec instrument. 2023  11:01  A significant status has been triggered by the BACTEC  instrument due to an increase in CO2 production.  Gram  stain of blood culture bottle  shows NO ORGANISMS SEEN.  Further investigation is in progress.  Note: Blood cultures are incubated for 5 days and  are monitored continuously. Positive blood cultures  are called to the RN and reported as soon as  they are identified.     Lactic Acid    Collection Time: 02/24/23  2:11 PM   Result Value Ref Range    Lactic Acid 2.4 (H) 0.5 - 2.0 mmol/L   Lactic Acid    Collection Time: 02/24/23  6:00 PM   Result Value Ref Range    Lactic Acid 2.0 0.5 - 2.0 mmol/L   POCT glucose device results    Collection Time: 02/24/23 10:14 PM   Result Value Ref Range    POC Glucose, Blood 173 (H) 65 - 99 mg/dL   Basic Metabolic Panel    Collection Time: 02/24/23 10:15 PM   Result Value Ref Range    Sodium 141 135 - 145 mmol/L    Potassium 4.6 3.6 - 5.5 mmol/L    Chloride 99 96 - 112 mmol/L    Co2 31 20 - 33 mmol/L    Glucose 188 (H) 65 - 99 mg/dL    Bun 8 8 - 22 mg/dL    Creatinine 0.37 (L) 0.50 - 1.40 mg/dL    Calcium 9.2 8.5 - 10.5 mg/dL    Anion Gap 11.0 7.0 - 16.0   MAGNESIUM    Collection Time: 02/24/23 10:15 PM   Result Value Ref Range    Magnesium 1.5 1.5 - 2.5 mg/dL   ESTIMATED GFR    Collection Time: 02/24/23 10:15 PM   Result Value Ref Range    GFR (CKD-EPI) 119 >60 mL/min/1.73 m 2   CBC with Differential    Collection Time: 02/25/23  4:00 AM   Result Value Ref Range    WBC 12.8 (H) 4.8 - 10.8 K/uL    RBC 4.52 (L) 4.70 - 6.10 M/uL    Hemoglobin 12.9 (L) 14.0 - 18.0 g/dL    Hematocrit 39.8 (L) 42.0 - 52.0 %    MCV 88.1 81.4 - 97.8 fL    MCH 28.5 27.0 - 33.0 pg    MCHC 32.4 (L) 33.6 - 35.0 g/dL    RDW 49.5 35.9 - 50.0 fL    Platelet Count 361 164 - 446 K/uL    MPV 9.3 9.0 - 12.9 fL    Neutrophils-Polys 78.50 (H) 44.00 - 72.00 %    Lymphocytes 10.40 (L) 22.00 - 41.00 %    Monocytes 10.30 0.00 - 13.40 %    Eosinophils 0.00 0.00 - 6.90 %    Basophils 0.20 0.00 - 1.80 %    Immature Granulocytes 0.60 0.00 - 0.90 %    Nucleated RBC 0.00 /100 WBC    Neutrophils (Absolute) 10.02 (H) 1.82 - 7.42 K/uL    Lymphs (Absolute) 1.33  1.00 - 4.80 K/uL    Monos (Absolute) 1.31 (H) 0.00 - 0.85 K/uL    Eos (Absolute) 0.00 0.00 - 0.51 K/uL    Baso (Absolute) 0.02 0.00 - 0.12 K/uL    Immature Granulocytes (abs) 0.08 0.00 - 0.11 K/uL    NRBC (Absolute) 0.00 K/uL   Comp Metabolic Panel (CMP)    Collection Time: 02/25/23  4:00 AM   Result Value Ref Range    Sodium 138 135 - 145 mmol/L    Potassium 3.7 3.6 - 5.5 mmol/L    Chloride 96 96 - 112 mmol/L    Co2 32 20 - 33 mmol/L    Anion Gap 10.0 7.0 - 16.0    Glucose 139 (H) 65 - 99 mg/dL    Bun 9 8 - 22 mg/dL    Creatinine 0.28 (L) 0.50 - 1.40 mg/dL    Calcium 9.3 8.5 - 10.5 mg/dL    AST(SGOT) 21 12 - 45 U/L    ALT(SGPT) 13 2 - 50 U/L    Alkaline Phosphatase 81 U/L    Total Bilirubin 0.3 0.1 - 1.5 mg/dL    Albumin 3.4 3.2 - 4.9 g/dL    Total Protein 6.7 6.0 - 8.2 g/dL    Globulin 3.3 1.9 - 3.5 g/dL    A-G Ratio 1.0 g/dL   Magnesium    Collection Time: 02/25/23  4:00 AM   Result Value Ref Range    Magnesium 1.5 1.5 - 2.5 mg/dL   Prothrombin Time    Collection Time: 02/25/23  4:00 AM   Result Value Ref Range    PT 14.0 12.0 - 14.6 sec    INR 1.09 0.87 - 1.13   CORTISOL    Collection Time: 02/25/23  4:00 AM   Result Value Ref Range    Cortisol 23.9 (H) 0.0 - 23.0 ug/dL   CORRECTED CALCIUM    Collection Time: 02/25/23  4:00 AM   Result Value Ref Range    Correct Calcium 9.8 8.5 - 10.5 mg/dL   ESTIMATED GFR    Collection Time: 02/25/23  4:00 AM   Result Value Ref Range    GFR (CKD-EPI) 129 >60 mL/min/1.73 m 2   POCT glucose device results    Collection Time: 02/25/23  8:14 AM   Result Value Ref Range    POC Glucose, Blood 131 (H) 65 - 99 mg/dL       Imaging  DX-CHEST-PORTABLE (1 VIEW)   Final Result         1. No significant interval change.      DX-CHEST-PORTABLE (1 VIEW)   Final Result      Large left hilar/perihilar mass density with volume loss in the left hemithorax and surrounding increased interstitial markings. Findings highly suspicious for malignancy possible lymphangitic tumor involvement.           Assessment/Plan  #Large mediastinal mass with compression of the airway and entanglement of the large vessels  Plan:  - Proceed with comfort care  - Notified Dr. Gutierrez  - Please do not hesitate to call me back if any questions arise or I can be of help    Yaritza Diaz MD RD  Pulmonary and Critical Care    Available on Voalte

## 2023-02-25 NOTE — CARE PLAN
The patient is Watcher - Medium risk of patient condition declining or worsening    Shift Goals  Clinical Goals: Stable Vitals, rest, Patent airway  Patient Goals: Sleep  Family Goals: ANAHY    Progress made toward(s) clinical / shift goals:  Pt is Alert and oriented, Vitals stable with Supplemental O2 via NC.  No acute events at this time.  Will continue with plan of care.      Problem: Knowledge Deficit - Standard  Goal: Patient and family/care givers will demonstrate understanding of plan of care, disease process/condition, diagnostic tests and medications  Outcome: Progressing     Problem: Skin Integrity  Goal: Skin integrity is maintained or improved  Outcome: Progressing     Problem: Fall Risk  Goal: Patient will remain free from falls  Outcome: Progressing

## 2023-02-25 NOTE — PROGRESS NOTES
Pt wanted to establish daughter as POA. Unit  out of the office for evening. Pt and pt's daughter okay with filling out the paperwork tomorrow. For tonight pt wants daughter to be decision maker if she is unable to make decisions for herself.

## 2023-02-25 NOTE — CARE PLAN
Shift Goals  Clinical Goals: Rest, comfort  Patient Goals: Sleep  Family Goals: Rest    Progress made toward(s) clinical / shift goals:  Pt transitioned to comfort care during shift. All interventions phagological and non-pharm in pursuit of promoting comfort for the pt.       Problem: Pain - Standard  Goal: Alleviation of pain or a reduction in pain to the patient’s comfort goal  Outcome: Progressing

## 2023-02-26 ENCOUNTER — HOME CARE VISIT (OUTPATIENT)
Dept: HOSPICE | Facility: HOSPICE | Age: 73
End: 2023-02-26

## 2023-02-26 VITALS
HEART RATE: 97 BPM | SYSTOLIC BLOOD PRESSURE: 61 MMHG | HEIGHT: 66 IN | BODY MASS INDEX: 19.84 KG/M2 | DIASTOLIC BLOOD PRESSURE: 31 MMHG | TEMPERATURE: 97.6 F | WEIGHT: 123.46 LBS | OXYGEN SATURATION: 90 % | RESPIRATION RATE: 25 BRPM

## 2023-02-26 PROBLEM — J98.8 COMPROMISED AIRWAY: Status: ACTIVE | Noted: 2023-02-26

## 2023-02-26 PROCEDURE — 700111 HCHG RX REV CODE 636 W/ 250 OVERRIDE (IP): Performed by: STUDENT IN AN ORGANIZED HEALTH CARE EDUCATION/TRAINING PROGRAM

## 2023-02-26 PROCEDURE — 700102 HCHG RX REV CODE 250 W/ 637 OVERRIDE(OP): Performed by: STUDENT IN AN ORGANIZED HEALTH CARE EDUCATION/TRAINING PROGRAM

## 2023-02-26 PROCEDURE — 700111 HCHG RX REV CODE 636 W/ 250 OVERRIDE (IP): Performed by: HOSPITALIST

## 2023-02-26 PROCEDURE — 770004 HCHG ROOM/CARE - ONCOLOGY PRIVATE *

## 2023-02-26 PROCEDURE — 99233 SBSQ HOSP IP/OBS HIGH 50: CPT | Performed by: STUDENT IN AN ORGANIZED HEALTH CARE EDUCATION/TRAINING PROGRAM

## 2023-02-26 PROCEDURE — A9270 NON-COVERED ITEM OR SERVICE: HCPCS | Performed by: STUDENT IN AN ORGANIZED HEALTH CARE EDUCATION/TRAINING PROGRAM

## 2023-02-26 RX ORDER — LORAZEPAM 2 MG/ML
1 INJECTION INTRAMUSCULAR
Status: DISCONTINUED | OUTPATIENT
Start: 2023-02-26 | End: 2023-02-27 | Stop reason: HOSPADM

## 2023-02-26 RX ORDER — MORPHINE SULFATE 100 MG/5ML
20 SOLUTION ORAL
Status: DISCONTINUED | OUTPATIENT
Start: 2023-02-26 | End: 2023-02-26

## 2023-02-26 RX ORDER — LORAZEPAM 2 MG/ML
1 CONCENTRATE ORAL
Status: DISCONTINUED | OUTPATIENT
Start: 2023-02-26 | End: 2023-02-27 | Stop reason: HOSPADM

## 2023-02-26 RX ORDER — BISACODYL 10 MG
10 SUPPOSITORY, RECTAL RECTAL
Status: DISCONTINUED | OUTPATIENT
Start: 2023-02-26 | End: 2023-02-27 | Stop reason: HOSPADM

## 2023-02-26 RX ORDER — CARBOXYMETHYLCELLULOSE SODIUM 5 MG/ML
1 SOLUTION/ DROPS OPHTHALMIC PRN
Status: DISCONTINUED | OUTPATIENT
Start: 2023-02-26 | End: 2023-02-27 | Stop reason: HOSPADM

## 2023-02-26 RX ORDER — ACETAMINOPHEN 325 MG/1
650 TABLET ORAL EVERY 4 HOURS PRN
Status: DISCONTINUED | OUTPATIENT
Start: 2023-02-26 | End: 2023-02-27 | Stop reason: HOSPADM

## 2023-02-26 RX ORDER — ACETAMINOPHEN 650 MG/1
650 SUPPOSITORY RECTAL EVERY 4 HOURS PRN
Status: DISCONTINUED | OUTPATIENT
Start: 2023-02-26 | End: 2023-02-27 | Stop reason: HOSPADM

## 2023-02-26 RX ORDER — MORPHINE SULFATE 100 MG/5ML
10 SOLUTION ORAL
Status: DISCONTINUED | OUTPATIENT
Start: 2023-02-26 | End: 2023-02-26

## 2023-02-26 RX ORDER — LACTULOSE 20 G/30ML
10 SOLUTION ORAL
Status: DISCONTINUED | OUTPATIENT
Start: 2023-02-26 | End: 2023-02-27 | Stop reason: HOSPADM

## 2023-02-26 RX ADMIN — MORPHINE SULFATE 10 MG: 100 SOLUTION ORAL at 08:36

## 2023-02-26 RX ADMIN — MORPHINE SULFATE 20 MG: 100 SOLUTION ORAL at 10:56

## 2023-02-26 RX ADMIN — LORAZEPAM 1 MG: 2 INJECTION INTRAMUSCULAR; INTRAVENOUS at 08:36

## 2023-02-26 RX ADMIN — MORPHINE SULFATE 4 MG: 4 INJECTION, SOLUTION INTRAMUSCULAR; INTRAVENOUS at 05:34

## 2023-02-26 RX ADMIN — LORAZEPAM 1 MG: 2 INJECTION INTRAMUSCULAR; INTRAVENOUS at 16:50

## 2023-02-26 RX ADMIN — LORAZEPAM 2 MG: 2 INJECTION INTRAMUSCULAR; INTRAVENOUS at 01:19

## 2023-02-26 RX ADMIN — MORPHINE SULFATE 5 MG: 10 INJECTION INTRAVENOUS at 16:50

## 2023-02-26 RX ADMIN — MORPHINE SULFATE 5 MG: 10 INJECTION INTRAVENOUS at 13:28

## 2023-02-26 RX ADMIN — MORPHINE SULFATE 5 MG: 10 INJECTION INTRAVENOUS at 20:23

## 2023-02-26 RX ADMIN — MORPHINE SULFATE 4 MG: 4 INJECTION, SOLUTION INTRAMUSCULAR; INTRAVENOUS at 01:25

## 2023-02-26 RX ADMIN — LORAZEPAM 1 MG: 2 INJECTION INTRAMUSCULAR; INTRAVENOUS at 10:04

## 2023-02-26 NOTE — PROGRESS NOTES
Unsuccessful attempt at oral pain meds due to pt condition. Pain medication dribbled out of pt's mouth. MD updated

## 2023-02-26 NOTE — PROGRESS NOTES
Hospital Medicine Daily Progress Note    Date of Service  2/26/2023    Chief Complaint  Shantal Quach is a 72 y.o. adult with mediastinal/hilar mass concerning for metastatic lung cancer admitted 2/24/2023 with acute on chronic hypoxic respiratory failure due to airway compromise.    Hospital Course  No notes on file    Interval Problem Update    2/26: Transferred to [facility] overnight. She is unresponsive to voice nor touch. Her daughter indicates that she awoke overnight and was then able to get comfortable with medication. Attempted to deliver SL medication for anticipation of community hospice but due to oropharyngeal airway obstruction it drips from her mouth. Referred to [facility] for GIP consideration versus enrollment in community hospice. POC discussed with pulmonologist Dr. Diaz.    I have discussed this patient's plan of care and discharge plan at IDT rounds today with Case Management, Nursing, Nursing leadership, and other members of the IDT team.    Consultants/Specialty  critical care and pulmonary    Code Status  Comfort Care/DNR    Disposition  Patient is medically cleared pending hospice enrollment  for discharge.   Anticipate discharge to to hospice.  I have placed the appropriate orders for post-discharge needs.    Review of Systems  Review of Systems   Unable to perform ROS: Patient unresponsive      Physical Exam       Physical Exam  Vitals and nursing note reviewed. Exam conducted with a chaperone present (Daughter and DIL at bedside).   Constitutional:       Appearance: Shantal Quach is cachectic.      Interventions: Nasal cannula in place.   HENT:      Head:      Comments: Bitemporal and bibuccal wasting     Mouth/Throat:      Mouth: Mucous membranes are dry.      Comments: Tongue protruding and raised to hard palate  Eyes:      General: No scleral icterus.     Conjunctiva/sclera: Conjunctivae normal.   Pulmonary:      Comments: Increased respiratory effort  Skin:     General:  Skin is dry.   Neurological:      Mental Status: Shantal Quach is unresponsive.   Psychiatric:      Comments: Unable to assess       Fluids    Intake/Output Summary (Last 24 hours) at 2/26/2023 1553  Last data filed at 2/26/2023 0955  Gross per 24 hour   Intake --   Output 100 ml   Net -100 ml       Laboratory  Recent Labs     02/24/23  1214 02/25/23  0400   WBC 16.6* 12.8*   RBC 5.36 4.52*   HEMOGLOBIN 15.1 12.9*   HEMATOCRIT 46.4 39.8*   MCV 86.6 88.1   MCH 28.2 28.5   MCHC 32.5* 32.4*   RDW 49.2 49.5   PLATELETCT 492* 361   MPV 9.4 9.3     Recent Labs     02/24/23  1214 02/24/23  2215 02/25/23  0400   SODIUM 143 141 138   POTASSIUM 3.2* 4.6 3.7   CHLORIDE 98 99 96   CO2 34* 31 32   GLUCOSE 172* 188* 139*   BUN 7* 8 9   CREATININE 0.29* 0.37* 0.28*   CALCIUM 9.8 9.2 9.3     Recent Labs     02/25/23  0400   INR 1.09               Imaging  DX-CHEST-PORTABLE (1 VIEW)   Final Result         1. No significant interval change.      DX-CHEST-PORTABLE (1 VIEW)   Final Result      Large left hilar/perihilar mass density with volume loss in the left hemithorax and surrounding increased interstitial markings. Findings highly suspicious for malignancy possible lymphangitic tumor involvement.           Assessment/Plan  * Compromised airway- (present on admission)  Assessment & Plan  Due to mediastinal / hilar maligancy  Comfort-only measures    Comfort measures only status  Assessment & Plan  Terminal Metastatic Lung Cancer to mediastinal lymph nodes  Morphine PRN dyspnea, pain  Lorazepam PRN anxiety, agitation  Zofran PRN nausea  Acetaminophen PRN fever  Lactulose, bisacodyl PRN constipation  Hospice referral to [facility] or [facility]    Hypokalemia- (present on admission)  Assessment & Plan  No further treatment for comfort-only measures    Atrial fibrillation with rapid ventricular response (HCC)  Assessment & Plan  No further treatment for comfort-only measures    Type 2 diabetes mellitus without  complication, with long-term current use of insulin (HCC)- (present on admission)  Assessment & Plan  No further treatment for comfort-only measures    Oropharyngeal dysphagia- (present on admission)  Assessment & Plan  No further treatment for comfort-only measures    Mediastinal mass- (present on admission)  Assessment & Plan  No further treatment for comfort-only measures    Acute and chronic respiratory failure (acute-on-chronic) (HCC)- (present on admission)  Assessment & Plan  As noted long d/w pt and her daughter in which the pt was quite clear she does not wish further evaluation or palliative treatment  Now Comfort Care status and Hospice consulted         VTE prophylaxis: pharmacologic prophylaxis contraindicated due to comfort care    I have performed a physical exam and reviewed and updated ROS and Plan today (2/26/2023). In review of yesterday's note (2/25/2023), there are no changes except as documented above.

## 2023-02-26 NOTE — ASSESSMENT & PLAN NOTE
Terminal Metastatic Lung Cancer to mediastinal lymph nodes  Morphine PRN dyspnea, pain  Lorazepam PRN anxiety, agitation  Zofran PRN nausea  Acetaminophen PRN fever  Lactulose, bisacodyl PRN constipation  Hospice referral to [facility] hospice or GIP

## 2023-02-26 NOTE — ASSESSMENT & PLAN NOTE
As noted long d/w pt and her daughter in which the pt was quite clear she does not wish further evaluation or palliative treatment  Now Comfort Care status and Hospice consulted

## 2023-02-26 NOTE — CARE PLAN
Problem: Skin Integrity  Goal: Skin integrity is maintained or improved  Outcome: Progressing     Problem: Pain - Standard  Goal: Alleviation of pain or a reduction in pain to the patient’s comfort goal  Outcome: Progressing     The patient is Watcher - Medium risk of patient condition declining or worsening    Shift Goals  Clinical Goals: pt will have comfort measures maintained  Patient Goals: ANAHY  Family Goals: pt will rest comfortably through the night    Progress made toward(s) clinical / shift goals:  Q 2 hour turns are in place.    Patient is not progressing towards the following goals:

## 2023-02-26 NOTE — PROGRESS NOTES
Pt arrived to unit at 1515 via hospital bed.  Assessment complete. Pt states nausea and that she is hot.  Pt denies pain. Pt and pt family oriented to unit routine and call light system.  Call light within reach. Will continue to monitor.

## 2023-02-26 NOTE — CARE PLAN
The patient is Watcher - Medium risk of patient condition declining or worsening    Shift Goals  Clinical Goals: pt will have comfort measures maintained  Patient Goals: ANAHY  Family Goals: pt will rest comfortably through the night    Progress made toward(s) clinical / shift goals: pt has had comfort measures maintained through the night with the administration of PRNs    Patient is not progressing towards the following goals: N/A    Problem: Knowledge Deficit - Standard  Goal: Patient and family/care givers will demonstrate understanding of plan of care, disease process/condition, diagnostic tests and medications  Outcome: Progressing  Note: Pt is somnolent and responds to pain only; pt's family is at bedside and is aware and understands plan of care; all questions have been answered at this time     Problem: Fall Risk  Goal: Patient will remain free from falls  Outcome: Progressing  Note: Bed is locked in the lowest position and pt's family is at bedside.     Problem: Pain - Standard  Goal: Alleviation of pain or a reduction in pain to the patient’s comfort goal  Outcome: Progressing  Note: Pain has been frequently assessed; pt is able to verbalize needs and makes them known. Pain has been controlled with PRN medications.

## 2023-02-26 NOTE — CARE PLAN
The patient is Stable - Low risk of patient condition declining or worsening    Shift Goals  Clinical Goals: comfort  Patient Goals: comfort  Family Goals: comfort    Progress made toward(s) clinical / shift goals:  rest     Patient is not progressing towards the following goals:

## 2023-02-26 NOTE — HOSPICE
Received call from Mady (onc). Family would like information on hospice services. Mady noted that the consult was placed this morning at 10AM. No consult found for patient. Quick intake done (LEAD). Patient currently on a high flow. Called daughter, Eugenio ([phone]), no answer. Left message for a call back. Please follow up tomorrow.

## 2023-02-27 NOTE — HOSPICE
Call placed to marisabel Becker. Set up meeting for Monday between 9:30-10 to discuss hospice services ([facility] vs Community)

## 2023-02-27 NOTE — PROGRESS NOTES
2145 Family called RN to room stating they think patient no longer had a heart beat and was not breathing. 2 Rns assesed patient. No sign of life, pronounced death at 2145.     Family was at bedside. Packed all belongings.  [employer] network, MD, and  were notified.   Post mortem care completed.     Patient transported to [facility].

## 2023-02-27 NOTE — DISCHARGE PLANNING
Case Management Discharge Planning    Admission Date: 2/24/2023  GMLOS:    ALOS: 2    6-Clicks ADL Score:    6-Clicks Mobility Score:        Anticipated Discharge Dispo: Discharge Disposition: D/T to hospice medical facility (51)    DME Needed: No    Action(s) Taken: Updated Provider/Nurse on Discharge Plan and Family Conference    Escalations Completed: None    Medically Clear: No    Next Steps:   Cm updated by MD that pt will likely need home hospice. CM met with pt's daughter Eugenio at the bedside and daughter in law Dee Dee. CM discussed basic of hospice referral process and provided pt/family with choice form. Choice form for [facility] sent to [facility] for referral. Discussed typical IP hospice vs Home. Cm called and spoke with [facility] hospice about if a Liaison is available to see pt /family today. CM updated by Gerri that likely she will follow up with pt/family on Monday.    Barriers to Discharge: None    Is the patient up for discharge tomorrow: No

## 2023-02-27 NOTE — DISCHARGE PLANNING
Received Choice form at 0416  Agency/Facility Name: [facility]  Referral sent per Choice form @ 5148

## 2023-02-27 NOTE — DISCHARGE SUMMARY
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    Comorbid Conditions at the Time of Death  Principal Problem:    Compromised airway POA: Yes  Active Problems:    Comfort measures only status POA: No    Acute and chronic respiratory failure (acute-on-chronic) (HCC) POA: Yes    Mediastinal mass POA: Yes    Oropharyngeal dysphagia POA: Yes    Type 2 diabetes mellitus without complication, with long-term current use of insulin (HCC) POA: Yes    Atrial fibrillation with rapid ventricular response (HCC) POA: Clinically Undetermined    Hypokalemia POA: Yes  Resolved Problems:    * No resolved hospital problems. *      History of Presenting Illness and Hospital Course  This is a 72 y.o. adult with pulmonary emphysema and hilar/mediastinal mass with planned outpatient evaluation admitted 2/24/2023 with airway compromise and respiratory distress. She required BIPAP and was admitted to the ICU but not intubated in accordance with her wishes for DNAR/DNI. She was initiated on amiodarone for Afib with RVR. She indicated a preference for no further diagnostic nor invasive interventions, for which she was transitioned to comfort-only measures. She passed peacefully during coordination of hospice.    Death Date: 02/26/23   Death Time: 2145    Pronounced By (RN1): Nanda Snider  Pronounced By (RN2): Scottie Klein

## 2023-02-27 NOTE — CARE PLAN
Problem: Knowledge Deficit - Standard  Goal: Patient and family/care givers will demonstrate understanding of plan of care, disease process/condition, diagnostic tests and medications  Description: Target End Date:  1-3 days or as soon as patient condition allows    Document in Patient Education    1.  Patient and family/caregiver oriented to unit, equipment, visitation policy and means for communicating concern  2.  Complete/review Learning Assessment  3.  Assess knowledge level of disease process/condition, treatment plan, diagnostic tests and medications  4.  Explain disease process/condition, treatment plan, diagnostic tests and medications  Outcome: Discharged - Not Met   The patient is      Shift Goals  Clinical Goals: pt will have comfort measures maintained  Patient Goals: ANAHY  Family Goals: pt will rest comfortably through the night    Progress made toward(s) clinical / shift goals:  pt      Patient is not progressing towards the following goals:

## 2023-02-28 ENCOUNTER — APPOINTMENT (OUTPATIENT)
Dept: RADIOLOGY | Facility: MEDICAL CENTER | Age: 73
End: 2023-02-28
Attending: INTERNAL MEDICINE
Payer: MEDICARE

## 2023-03-01 LAB
BACTERIA BLD CULT: ABNORMAL
BACTERIA BLD CULT: NORMAL
SIGNIFICANT IND 70042: ABNORMAL
SIGNIFICANT IND 70042: NORMAL
SITE SITE: ABNORMAL
SITE SITE: NORMAL
SOURCE SOURCE: ABNORMAL
SOURCE SOURCE: NORMAL

## 2023-03-20 LAB
FUNGUS SPEC CULT: NORMAL
FUNGUS SPEC FUNGUS STN: NORMAL
SIGNIFICANT IND 70042: NORMAL
SITE SITE: NORMAL
SOURCE SOURCE: NORMAL

## 2023-04-05 LAB
MYCOBACTERIUM SPEC CULT: NORMAL
RHODAMINE-AURAMINE STN SPEC: NORMAL
SIGNIFICANT IND 70042: NORMAL
SITE SITE: NORMAL
SOURCE SOURCE: NORMAL

## 2025-05-08 NOTE — PROGRESS NOTES
Monitor Summary     -ST     (R) PVC     .18/.08/.35         Patient returned the call and states the letter is no longer needed

## (undated) DEVICE — SYRINGE SAFETY 5 ML 18 GA X 1-1/2 BLUNT LL (100/BX 4BX/CA)

## (undated) DEVICE — TUBE E-T HI-LO CUFF 7.5MM (10EA/PK)

## (undated) DEVICE — SENSOR OXIMETER ADULT SPO2 RD SET (20EA/BX)

## (undated) DEVICE — TUBE E-T HI-LO CUFF 7.0MM (10EA/PK)

## (undated) DEVICE — CATHETER IV SAFETY 22 GA X 1 (50EA/BX)

## (undated) DEVICE — COVER LIGHT HANDLE ALC PLUS DISP (18EA/BX)

## (undated) DEVICE — TOWEL STOP TIMEOUT SAFETY FLAG (40EA/CA)

## (undated) DEVICE — GLOVE BIOGEL SZ 8 SURGICAL PF LTX - (50PR/BX 4BX/CA)

## (undated) DEVICE — ELECTRODE DUAL RETURN W/ CORD - (50/PK)

## (undated) DEVICE — TUBE SUCTION YANKAUER  1/4 X 6FT (20EA/CA)"

## (undated) DEVICE — CANISTER SUCTION RIGID RED 1500CC (40EA/CA)

## (undated) DEVICE — TUBE E-T HI-LO CUFF 6.5MM (10EA/BX)

## (undated) DEVICE — SYRINGE SAFETY 10 ML 18 GA X 1 1/2 BLUNT LL (100/BX 4BX/CA)

## (undated) DEVICE — CANISTER SUCTION 3000ML MECHANICAL FILTER AUTO SHUTOFF MEDI-VAC NONSTERILE LF DISP  (40EA/CA)

## (undated) DEVICE — SUCTION INSTRUMENT YANKAUER BULBOUS TIP W/O VENT (50EA/CA)

## (undated) DEVICE — TUBE CONNECTING SUCTION - CLEAR PLASTIC STERILE 72 IN (50EA/CA)

## (undated) DEVICE — CATHETER IV SAFETY 24 GA X 3/4 (50EA/BX)

## (undated) DEVICE — KIT  I.V. START (100EA/CA)

## (undated) DEVICE — TUBING CLEARLINK DUO-VENT - C-FLO (48EA/CA)

## (undated) DEVICE — MASK WITH FACE SHIELD (25/BX 4BX/CA)

## (undated) DEVICE — GLOVE, LITE (PAIR)

## (undated) DEVICE — TUBE E-T HI-LO CUFF 8.0MM (10EA/PK)

## (undated) DEVICE — SYRINGE SAFETY 3 ML 18 GA X 1 1/2 BLUNT LL (100/BX 8BX/CA)

## (undated) DEVICE — SYRINGE DISP. 60 CC LL - (30/BX, 12BX/CA)**WHEN THESE ARE GONE ORDER #500206**

## (undated) DEVICE — SET I.V. EXTENSION DIAL-A- - FLOW REGULATOR (48EA/CA)

## (undated) DEVICE — SPONGE GAUZE NON-STERILE 4X4 - (2000/CA 10PK/CA)

## (undated) DEVICE — WATER IRRIGATION STERILE 1000ML (12EA/CA)

## (undated) DEVICE — SET EXTENSION WITH 2 PORTS (48EA/CA) ***PART #2C8610 IS A SUBSTITUTE*****

## (undated) DEVICE — GOWN SURGEONS LARGE - (32/CA)

## (undated) DEVICE — CANNULA O2 COMFORT SOFT EAR ADULT 7 FT TUBING (50/CA)

## (undated) DEVICE — TUBE E-T HI-LO CUFF 8.5MM (10EA/PK)

## (undated) DEVICE — LACTATED RINGERS INJ 1000 ML - (14EA/CA 60CA/PF)

## (undated) DEVICE — CATHETER IV SAFETY 20 GA X 1-1/4 (50/BX)

## (undated) DEVICE — ARGON COAG 10MM PROBE - 10/CA